# Patient Record
Sex: FEMALE | Race: WHITE | NOT HISPANIC OR LATINO | Employment: FULL TIME | URBAN - METROPOLITAN AREA
[De-identification: names, ages, dates, MRNs, and addresses within clinical notes are randomized per-mention and may not be internally consistent; named-entity substitution may affect disease eponyms.]

---

## 2017-01-27 ENCOUNTER — LAB VISIT (OUTPATIENT)
Dept: LAB | Facility: HOSPITAL | Age: 37
End: 2017-01-27
Attending: INTERNAL MEDICINE
Payer: COMMERCIAL

## 2017-01-27 ENCOUNTER — TELEPHONE (OUTPATIENT)
Dept: GASTROENTEROLOGY | Facility: CLINIC | Age: 37
End: 2017-01-27

## 2017-01-27 ENCOUNTER — OFFICE VISIT (OUTPATIENT)
Dept: GASTROENTEROLOGY | Facility: CLINIC | Age: 37
End: 2017-01-27
Payer: COMMERCIAL

## 2017-01-27 VITALS
HEIGHT: 56 IN | HEART RATE: 69 BPM | BODY MASS INDEX: 20.43 KG/M2 | DIASTOLIC BLOOD PRESSURE: 63 MMHG | SYSTOLIC BLOOD PRESSURE: 117 MMHG | WEIGHT: 90.81 LBS

## 2017-01-27 DIAGNOSIS — K85.90 PANCREATITIS, UNSPECIFIED PANCREATITIS TYPE: Primary | ICD-10-CM

## 2017-01-27 DIAGNOSIS — K85.90 PANCREATITIS, UNSPECIFIED PANCREATITIS TYPE: ICD-10-CM

## 2017-01-27 LAB
CHOLEST/HDLC SERPL: 2.9 {RATIO}
HDL/CHOLESTEROL RATIO: 35 %
HDLC SERPL-MCNC: 157 MG/DL
HDLC SERPL-MCNC: 55 MG/DL
LDLC SERPL CALC-MCNC: 84.2 MG/DL
NONHDLC SERPL-MCNC: 102 MG/DL
TRIGL SERPL-MCNC: 89 MG/DL

## 2017-01-27 PROCEDURE — 99999 PR PBB SHADOW E&M-NEW PATIENT-LVL III: CPT | Mod: PBBFAC,,, | Performed by: INTERNAL MEDICINE

## 2017-01-27 PROCEDURE — 1159F MED LIST DOCD IN RCRD: CPT | Mod: S$GLB,,, | Performed by: INTERNAL MEDICINE

## 2017-01-27 PROCEDURE — 36415 COLL VENOUS BLD VENIPUNCTURE: CPT

## 2017-01-27 PROCEDURE — 82787 IGG 1 2 3 OR 4 EACH: CPT

## 2017-01-27 PROCEDURE — 80061 LIPID PANEL: CPT

## 2017-01-27 PROCEDURE — 99204 OFFICE O/P NEW MOD 45 MIN: CPT | Mod: S$GLB,,, | Performed by: INTERNAL MEDICINE

## 2017-01-27 RX ORDER — BUTALBITAL, ACETAMINOPHEN AND CAFFEINE 50; 325; 40 MG/1; MG/1; MG/1
TABLET ORAL
Refills: 0 | COMMUNITY
Start: 2016-11-30 | End: 2017-09-27

## 2017-01-27 RX ORDER — ONDANSETRON HYDROCHLORIDE 8 MG/1
8 TABLET, FILM COATED ORAL EVERY 8 HOURS
Refills: 0 | COMMUNITY
Start: 2016-10-28 | End: 2017-09-27 | Stop reason: SDUPTHER

## 2017-01-27 NOTE — LETTER
January 27, 2017      Elder Alegria MD  [address] [facility] [place] [address]           [facility]  [address]  [address]  [facility] [address]  Phone: [phone]          Patient: Gogo Muro   MR Number: [id number]   YOB: 1980   Date of Visit: 1/27/2017       Dear Dr. Elder Alegria:    Thank you for referring Gogo Muro to me for evaluation. Attached you will find relevant portions of my assessment and plan of care.    If you have questions, please do not hesitate to call me. I look forward to following Gogo Muro along with you.    Sincerely,    Ketan Lara MD    Enclosure  CC:  No Recipients    If you would like to receive this communication electronically, please contact [email or web address] or [phone] to request more information on [email or web address] Link access.    For providers and/or their staff who would like to refer a patient to [facility], please contact us through our one-stop-shop provider referral line, [facility], at [phone].    If you feel you have received this communication in error or would no longer like to receive these types of communications, please e-mail [email or web address]

## 2017-01-27 NOTE — TELEPHONE ENCOUNTER
Patient scheduled for Upper EUS on Thursday 02/02/17 with Dr. Sewell.     Clear liquid and NPO prep instructions given and understood.   Explained that pt will receive a phone call with final prep instructions and arrival time.

## 2017-01-27 NOTE — PROGRESS NOTES
Subjective:       Patient ID: Gogo Muro is a 36 y.o. female.    Chief Complaint: Pancreatitis    This is a 28-year-old female who presents as a referral her prior gastroenterologist for evaluations of suspected recurrent idiopathic pancreatitis.  Symptoms began at an early age approximately 15 years ago when she was hospitalized in [place] for abdominal pain with suspected idiopathic pancreatitis.  There is no history of alcohol use and multiple ultrasounds over the years were reportedly normal.  In addition a CT with contrast and MRCP were normal for the results from her physicians.  She has a chronically elevated amylase and lipase less than 2 times upper limit of normal.  Symptoms she describes are epigastric pain which can radiate to the lower abdomen and some intermittent loose stools.  She had an upper endoscopy which was done reportedly showing only a small hiatal hernia and colonoscopy was unable to be completed.  In her imaging no intra-or extra hepatic biliary ductal dilatation was noted with a 3 mm common bile duct which is normal as was the spleen and liver.  There is no family history of hypertriglyceridemia.  I have reviewed her laboratory data as well noting LT of 10, AST 15, calcium 9.3 lipase 74.  She has been started on Creon with meals for the last 3 days and has noted improvement.  She does not pay attention to her loose stools.  No other exacerbating or relieving factors or other associated symptoms, the pain is severe and nonradiating.    The following portions of the patient's history were reviewed and updated as appropriate: allergies, current medications, past family history, past medical history, past social history, past surgical history and problem list.    (Portions of this note were dictated using voice recognition software and may contain dictation related errors in spelling/grammar/syntax not found on text review)    HPI  Review of Systems   Constitutional: Negative for appetite  change, chills and fever.   HENT: Negative for postnasal drip and trouble swallowing.    Eyes: Negative for pain and redness.   Respiratory: Negative for cough, choking, chest tightness and shortness of breath.    Cardiovascular: Negative for chest pain and leg swelling.   Gastrointestinal: Positive for abdominal pain. Negative for abdominal distention, anal bleeding, blood in stool, constipation, diarrhea, nausea, rectal pain and vomiting.   Endocrine: Negative for cold intolerance and heat intolerance.   Genitourinary: Negative for difficulty urinating and hematuria.   Musculoskeletal: Negative for arthralgias and back pain.   Skin: Negative for color change and pallor.   Allergic/Immunologic: Negative for environmental allergies and food allergies.   Neurological: Negative for dizziness and light-headedness.   Hematological: Negative for adenopathy. Does not bruise/bleed easily.   Psychiatric/Behavioral: Negative for agitation and behavioral problems.       Objective:      Physical Exam   Constitutional: She is oriented to person, place, and time. She appears well-developed and well-nourished. No distress.   HENT:   Head: Normocephalic and atraumatic.   Eyes: Conjunctivae are normal. No scleral icterus.   Neck: Normal range of motion. Neck supple. No tracheal deviation present. No thyromegaly present.   Cardiovascular: Normal rate and regular rhythm.  Exam reveals no gallop and no friction rub.    No murmur heard.  Pulmonary/Chest: Effort normal and breath sounds normal. No respiratory distress. She has no wheezes.   Abdominal: Soft. Bowel sounds are normal. She exhibits no distension. There is no tenderness.   Musculoskeletal:        Right wrist: She exhibits normal range of motion and no tenderness.        Left wrist: She exhibits normal range of motion and no tenderness.   Lymphadenopathy:        Head (right side): No submental and no submandibular adenopathy present.        Head (left side): No submental and  no submandibular adenopathy present.   Neurological: She is alert and oriented to person, place, and time.   Skin: Skin is warm and dry. No rash noted. She is not diaphoretic. No erythema.   Psychiatric: She has a normal mood and affect. Her behavior is normal.   Nursing note and vitals reviewed.      Outside records reviewed  Labs: reviewed, as above  Imaging: report of CT reviewed, as above  Assessment:       1. Pancreatitis, unspecified pancreatitis type        Plan:   1. Continue creon  2. EUS to evaluate gb, bile ducts, pancreas  3. Dietary modifications  4. Consider GES if EUS is normal  5. Labs for autoimmune pancreatitis

## 2017-01-30 LAB — IGG4 SER-MCNC: 70 MG/DL

## 2017-02-02 ENCOUNTER — ANESTHESIA (OUTPATIENT)
Dept: ENDOSCOPY | Facility: HOSPITAL | Age: 37
End: 2017-02-02
Payer: COMMERCIAL

## 2017-02-02 ENCOUNTER — ANESTHESIA EVENT (OUTPATIENT)
Dept: ENDOSCOPY | Facility: HOSPITAL | Age: 37
End: 2017-02-02
Payer: COMMERCIAL

## 2017-02-02 ENCOUNTER — SURGERY (OUTPATIENT)
Age: 37
End: 2017-02-02

## 2017-02-02 ENCOUNTER — HOSPITAL ENCOUNTER (OUTPATIENT)
Facility: HOSPITAL | Age: 37
Discharge: HOME | End: 2017-02-02
Attending: INTERNAL MEDICINE | Admitting: INTERNAL MEDICINE
Payer: COMMERCIAL

## 2017-02-02 VITALS
SYSTOLIC BLOOD PRESSURE: 114 MMHG | HEIGHT: 56 IN | OXYGEN SATURATION: 100 % | WEIGHT: 89 LBS | BODY MASS INDEX: 20.02 KG/M2 | TEMPERATURE: 98 F | HEART RATE: 92 BPM | DIASTOLIC BLOOD PRESSURE: 40 MMHG | RESPIRATION RATE: 18 BRPM

## 2017-02-02 DIAGNOSIS — K85.90 PANCREATITIS, UNSPECIFIED PANCREATITIS TYPE: ICD-10-CM

## 2017-02-02 PROCEDURE — 43239 EGD BIOPSY SINGLE/MULTIPLE: CPT | Performed by: INTERNAL MEDICINE

## 2017-02-02 PROCEDURE — 27201012 HC FORCEPS, HOT/COLD, DISP: Performed by: INTERNAL MEDICINE

## 2017-02-02 PROCEDURE — 25000003 PHARM REV CODE 250: Performed by: INTERNAL MEDICINE

## 2017-02-02 PROCEDURE — 63600175 PHARM REV CODE 636 W HCPCS: Performed by: NURSE ANESTHETIST, CERTIFIED REGISTERED

## 2017-02-02 PROCEDURE — 37000008 HC ANESTHESIA 1ST 15 MINUTES: Performed by: INTERNAL MEDICINE

## 2017-02-02 PROCEDURE — 88305 TISSUE EXAM BY PATHOLOGIST: CPT | Performed by: PATHOLOGY

## 2017-02-02 PROCEDURE — 37000009 HC ANESTHESIA EA ADD 15 MINS: Performed by: INTERNAL MEDICINE

## 2017-02-02 PROCEDURE — 25000003 PHARM REV CODE 250: Performed by: NURSE ANESTHETIST, CERTIFIED REGISTERED

## 2017-02-02 PROCEDURE — 43237 ENDOSCOPIC US EXAM ESOPH: CPT | Mod: ,,, | Performed by: INTERNAL MEDICINE

## 2017-02-02 PROCEDURE — 43259 EGD US EXAM DUODENUM/JEJUNUM: CPT | Performed by: INTERNAL MEDICINE

## 2017-02-02 PROCEDURE — 88305 TISSUE EXAM BY PATHOLOGIST: CPT | Mod: 26,,, | Performed by: PATHOLOGY

## 2017-02-02 RX ORDER — LIDOCAINE HCL/PF 100 MG/5ML
SYRINGE (ML) INTRAVENOUS
Status: DISCONTINUED | OUTPATIENT
Start: 2017-02-02 | End: 2017-02-02

## 2017-02-02 RX ORDER — MIDAZOLAM HYDROCHLORIDE 1 MG/ML
INJECTION, SOLUTION INTRAMUSCULAR; INTRAVENOUS
Status: DISCONTINUED | OUTPATIENT
Start: 2017-02-02 | End: 2017-02-02

## 2017-02-02 RX ORDER — SODIUM CHLORIDE 9 MG/ML
INJECTION, SOLUTION INTRAVENOUS CONTINUOUS
Status: DISCONTINUED | OUTPATIENT
Start: 2017-02-03 | End: 2017-02-02 | Stop reason: HOSPADM

## 2017-02-02 RX ORDER — PROPOFOL 10 MG/ML
VIAL (ML) INTRAVENOUS
Status: DISCONTINUED | OUTPATIENT
Start: 2017-02-02 | End: 2017-02-02

## 2017-02-02 RX ORDER — FENTANYL CITRATE 50 UG/ML
INJECTION, SOLUTION INTRAMUSCULAR; INTRAVENOUS
Status: DISCONTINUED | OUTPATIENT
Start: 2017-02-02 | End: 2017-02-02

## 2017-02-02 RX ORDER — PROPOFOL 10 MG/ML
VIAL (ML) INTRAVENOUS CONTINUOUS PRN
Status: DISCONTINUED | OUTPATIENT
Start: 2017-02-02 | End: 2017-02-02

## 2017-02-02 RX ORDER — ONDANSETRON 2 MG/ML
INJECTION INTRAMUSCULAR; INTRAVENOUS
Status: DISCONTINUED | OUTPATIENT
Start: 2017-02-02 | End: 2017-02-02

## 2017-02-02 RX ADMIN — SODIUM CHLORIDE: 0.9 INJECTION, SOLUTION INTRAVENOUS at 01:02

## 2017-02-02 RX ADMIN — FENTANYL CITRATE 50 MCG: 50 INJECTION, SOLUTION INTRAMUSCULAR; INTRAVENOUS at 01:02

## 2017-02-02 RX ADMIN — PROPOFOL 150 MG: 10 INJECTION, EMULSION INTRAVENOUS at 01:02

## 2017-02-02 RX ADMIN — ONDANSETRON 4 MG: 2 INJECTION, SOLUTION INTRAMUSCULAR; INTRAVENOUS at 01:02

## 2017-02-02 RX ADMIN — MIDAZOLAM 1 MG: 1 INJECTION INTRAMUSCULAR; INTRAVENOUS at 01:02

## 2017-02-02 RX ADMIN — SODIUM CHLORIDE: 0.9 INJECTION, SOLUTION INTRAVENOUS at 12:02

## 2017-02-02 RX ADMIN — LIDOCAINE HYDROCHLORIDE 50 MG: 20 INJECTION, SOLUTION INTRAVENOUS at 01:02

## 2017-02-02 RX ADMIN — PROPOFOL 150 MCG/KG/MIN: 10 INJECTION, EMULSION INTRAVENOUS at 01:02

## 2017-02-02 NOTE — TRANSFER OF CARE
"Anesthesia Transfer of Care Note    Patient: Gogo Muro    Procedure(s) Performed: Procedure(s) (LRB):  ULTRASOUND-ENDOSCOPIC-UPPER (N/A)    Patient location: GI    Anesthesia Type: MAC    Transport from OR: Transported from OR on room air with adequate spontaneous ventilation    Post pain: adequate analgesia    Post assessment: no apparent anesthetic complications and tolerated procedure well    Post vital signs: stable    Level of consciousness: awake, oriented and alert    Nausea/Vomiting: no nausea/vomiting    Complications: none          Last vitals:   Visit Vitals    /68    Pulse 86    Temp 36.7 °C (98 °F)    Resp 17    Ht 4' 8" (1.422 m)    Wt 40.4 kg (89 lb)    SpO2 99%    Breastfeeding No    BMI 19.95 kg/m2     "

## 2017-02-02 NOTE — IP AVS SNAPSHOT
[facility]  [address]  [place] [address]  Phone: [phone]           Patient Discharge Instructions     Our goal is to set you up for success. This packet includes information on your condition, medications, and your home care. It will help you to care for yourself so you don't get sicker and need to go back to the hospital.     Please ask your nurse if you have any questions.        There are many details to remember when preparing to leave the hospital. Here is what you will need to do:    1. Take your medicine. If you are prescribed medications, review your Medication List in the following pages. You may have new medications to  at the pharmacy and others that you'll need to stop taking. Review the instructions for how and when to take your medications. Talk with your doctor or nurses if you are unsure of what to do.     2. Go to your follow-up appointments. Specific follow-up information is listed in the following pages. Your may be contacted by a transition nurse or clinical provider about future appointments. Be sure we have all of the phone numbers to reach you, if needed. Please contact your provider's office if you are unable to make an appointment.     3. Watch for warning signs. Your doctor or nurse will give you detailed warning signs to watch for and when to call for assistance. These instructions may also include educational information about your condition. If you experience any of warning signs to your health, call your doctor.               ** Verify the list of medication(s) below is accurate and up to date. Carry this with you in case of emergency. If your medications have changed, please notify your healthcare provider.             Medication List      CONTINUE taking these medications        Additional Info                      butalbital-acetaminophen-caffeine -40 mg -40 mg per tablet   Commonly known as:  FIORICET, ESGIC   Refills:  0     Instructions:  take 1-2 tablets by mouth every 6 hours if needed for headache     Begin Date    AM    Noon    PM    Bedtime       CREON 24,000-76,000 -120,000 unit capsule   Refills:  0   Dose:  1 capsule   Generic drug:  lipase-protease-amylase 24,000-76,000-120,000 units    Instructions:  Take 1 capsule by mouth 3 (three) times daily with meals.     Begin Date    AM    Noon    PM    Bedtime       ondansetron 8 MG tablet   Commonly known as:  ZOFRAN   Refills:  0   Dose:  8 mg    Instructions:  Take 8 mg by mouth every 8 (eight) hours.     Begin Date    AM    Noon    PM    Bedtime                  Please bring to all follow up appointments:    1. A copy of your discharge instructions.  2. All medicines you are currently taking in their original bottles.  3. Identification and insurance card.    Please arrive 15 minutes ahead of scheduled appointment time.    Please call 24 hours in advance if you must reschedule your appointment and/or time.        Follow-up Information     Follow up with Elder Alegria MD.    Specialty:  Gastroenterology    Why:  As needed    Contact information:    [address] [facility] [address]  [phone]          Follow up with Ketan Lara MD In 6 weeks.    Specialty:  Gastroenterology    Contact information:    [address]  [address]  [place] [id number] [phone]          Follow up with Gene Sewell MD.    Specialty:  Gastroenterology    Why:  As needed, Office will call with biopsy / pathology report    Contact information:    [address]  [address]  [facility] [address] [phone]          Discharge Instructions     Future Orders    Diet general     Questions:    Total calories:      Fat restriction, if any:      Protein restriction, if any:      Na restriction, if any:      Fluid restriction:      Additional restrictions:      Endoscopic ultrasound (upper)     Questions:    Pre-op visit required?:      Special needs:          Discharge  "Instructions       Post Upper EUS Instructions:     Gogo Muro  2/2/2017  Gene Sosa*    1. Do Not eat or drink anything for 1 hour. Try sips of water first. If tolerated, resume your regular diet or one recommended by your physician.  2. Do not drive, or operate machinery, make critical decisions, or do activities that require coordination or balance for 24 hours.  3. You may experience a sore throat for 24 to 48 hours. You may use throat lozenges or gargle with warm salt water to relieve the discomfort.  4. Because air was put into your stomach during the procedure, you may experience some belching.   Go directly to the emergency room if you notice any of the following:                              Chills and/or fever over 101                Persistent vomiting or vomiting with blood                Severe abdominal pain, other than gas cramps                Severe chest pain                Black, tarry stools    If you have any questions or problems, please call your Physician:    Gene Sosa* Phone: ***    Lab Results: [phone]    If a complication or emergency situation arises and you are unable to reach your Physician - GO TO THE EMERGENCY ROOM.        Primary Diagnosis     Your primary diagnosis was:  Recurrent Pancreatitis      Admission Information     Date & Time Provider Department [facility]    2/2/2017  9:53 AM Gene Sewell MD [facility] [id number]      Care Providers     Provider Role Specialty Primary office phone    Gene Sewell MD Attending Provider Gastroenterology [phone]    Gene Sewell MD Surgeon  Gastroenterology [phone]      Your Vitals Were     BP Pulse Temp Resp Height Weight    107/75 (BP Location: Left arm, Patient Position: Lying, BP Method: Automatic) 90 97.7 °F (36.5 °C) (Oral) 16 4' 8" (1.422 m) 40.4 kg (89 lb)    SpO2 BMI             99% 19.95 kg/m2         Recent Lab Values     No lab " values to display.      Pending Labs     Order Current Status    Specimen to Pathology - Surgery Collected (02/02/17 1400)      Allergies as of 2/2/2017        Reactions    Ibuprofen Palpitations    Keflex [Cephalexin] Rash    Sulfa (Sulfonamide Antibiotics) Rash      [facility]     [facility] Nurse Care Line - 24/7 Assistance  Unless otherwise directed by your provider, please contact [facility], our nurse care line that is available for 24/7 assistance.     Registered nurses in the [facility] provide clinical advisement, health education, appointment booking, and other advisory services.  Call for this free service at [phone].        Advance Directives     An advance directive is a document which, in the event you are no longer able to make decisions for yourself, tells your healthcare team what kind of treatment you do or do not want to receive, or who you would like to make those decisions for you.  If you do not currently have an advance directive, [facility] encourages you to create one.  For more information call:  [phone]), [phone] ([phone]),  or log on to [email or web address].        Language Assistance Services     ATTENTION: Language assistance services are available, free of charge. Please call [phone].      ATENCIÓN: Si habla español, tiene a jacobson disposición servicios gratuitos de asistencia lingüística. Llame al [phone].     CHÚ Ý: N?u b?n nói Ti?ng Vi?t, có các d?ch v? h? tr? ngôn ng? mi?n phí dành cho b?n. G?i s? [phone].         [facility] complies with applicable Federal civil rights laws and does not discriminate on the basis of race, color, national origin, age, disability, or sex.

## 2017-02-02 NOTE — INTERVAL H&P NOTE
The patient has been examined and the H&P has been reviewed:    I concur with the findings and changes have been noted since the H&P was written: symptoms have greatly improved with initiation of Creon therapy    Anesthesia/Surgery risks, benefits and alternative options discussed and understood by patient/family.          Active Hospital Problems    Diagnosis  POA    Pancreatitis, recurrent [K86.1]  Yes      Resolved Hospital Problems    Diagnosis Date Resolved POA   No resolved problems to display.

## 2017-02-02 NOTE — DISCHARGE INSTRUCTIONS
Post Upper EUS Instructions:     Gogocarrie Burriss  2/2/2017  Gene Sosa*    1. Do Not eat or drink anything for 1 hour. Try sips of water first. If tolerated, resume your regular diet or one recommended by your physician.  2. Do not drive, or operate machinery, make critical decisions, or do activities that require coordination or balance for 24 hours.  3. You may experience a sore throat for 24 to 48 hours. You may use throat lozenges or gargle with warm salt water to relieve the discomfort.  4. Because air was put into your stomach during the procedure, you may experience some belching.   Go directly to the emergency room if you notice any of the following:                              Chills and/or fever over 101                Persistent vomiting or vomiting with blood                Severe abdominal pain, other than gas cramps                Severe chest pain                Black, tarry stools    If you have any questions or problems, please call your Physician:    Gene Sosa* Phone: ***    Lab Results: [phone]    If a complication or emergency situation arises and you are unable to reach your Physician - GO TO THE EMERGENCY ROOM.

## 2017-02-02 NOTE — PLAN OF CARE
Patient progressing appropriately. Discharge criteria met. Dr. Sewell at bedside. No distress noted.

## 2017-02-02 NOTE — ANESTHESIA POSTPROCEDURE EVALUATION
"Anesthesia Post Evaluation    Patient: Gogo Muro    Procedure(s) Performed: Procedure(s) (LRB):  ULTRASOUND-ENDOSCOPIC-UPPER (N/A)    Final Anesthesia Type: MAC  Patient location during evaluation: GI PACU  Patient participation: Yes- Able to Participate  Level of consciousness: awake and alert and oriented  Post-procedure vital signs: reviewed and stable  Pain management: adequate  Airway patency: patent  PONV status at discharge: No PONV  Anesthetic complications: no      Cardiovascular status: blood pressure returned to baseline and hemodynamically stable  Respiratory status: unassisted, room air and spontaneous ventilation  Hydration status: euvolemic  Follow-up not needed.        Visit Vitals    /68    Pulse 86    Temp 36.7 °C (98 °F)    Resp 17    Ht 4' 8" (1.422 m)    Wt 40.4 kg (89 lb)    SpO2 99%    Breastfeeding No    BMI 19.95 kg/m2       Pain/Isabelle Score: Pain Assessment Performed: Yes (2/2/2017 11:26 AM)  Presence of Pain: assumed presence of pain (specify) (2/2/2017 11:26 AM)  Pain Rating Prior to Med Admin: 0 (2/2/2017 11:26 AM)  Pain Rating Post Med Admin: 0 (2/2/2017 11:26 AM)      "

## 2017-02-02 NOTE — ANESTHESIA PREPROCEDURE EVALUATION
2017  Gogo Muro is a 36 y.o., female w pancreattis for EUS.    There is no problem list on file for this patient.    Past Medical History   Diagnosis Date    Pancreatitis     PONV (postoperative nausea and vomiting)      Past Surgical History   Procedure Laterality Date     section       x 3    Hysterectomy       ALLERGIES 2017   Allergen Reactions    Ibuprofen Palpitations    Keflex [cephalexin] Rash    Sulfa (sulfonamide antibiotics) Rash     ANES-RELATED HOME Rx  Fiorecet    [facility] Anesthesia Evaluation         Review of Systems  Anesthesia Hx:  Hx of Anesthetic complications History of prior surgery of interest to airway management or planning: Personal Hx of Anesthesia complications, Post-Operative Nausea/Vomiting   Social:  Non-Smoker, No Alcohol Use    Hematology/Oncology:  Hematology Normal        EENT/Dental:EENT/Dental Normal   Cardiovascular:  Cardiovascular Normal     Pulmonary:  Pulmonary Normal    Renal/:  Renal/ Normal     Hepatic/GI:   Pancreatitis   OB/GYN/PEDS:  hysterectomy   Neurological:  Neurology Normal    Endocrine:  Endocrine Normal        Physical Exam   Airway/Jaw/Neck:  AIRWAY FINDINGS: Normal      Eyes/Ears/Nose:  EYES/EARS/NOSE FINDINGS: Normal    Chest/Lungs:  Chest/Lungs Clear    Heart/Vascular:  Heart Findings: Normal Heart murmur: negative       Mental Status:  Mental Status Findings: Normal        Anesthesia Plan  Type of Anesthesia, risks & benefits discussed:  Anesthesia Type:  MAC  Patient's Preference:   Intra-op Monitoring Plan:   Intra-op Monitoring Plan Comments:   Post Op Pain Control Plan:   Post Op Pain Control Plan Comments:   Induction:   IV  Beta Blocker:  Patient is not currently on a Beta-Blocker (No further documentation required).       Informed Consent: Patient understands risks and agrees with Anesthesia plan.  Questions  answered. Anesthesia consent signed with patient.  ASA Score: 2     Day of Surgery Review of History & Physical:        Anesthesia Plan Notes: 2017-02-02   - moderately high anxiety   - NPO 2 days   - PONV        Ready For Surgery From Anesthesia Perspective.

## 2017-02-06 ENCOUNTER — OFFICE VISIT (OUTPATIENT)
Dept: GASTROENTEROLOGY | Facility: CLINIC | Age: 37
End: 2017-02-06
Payer: COMMERCIAL

## 2017-02-06 ENCOUNTER — LAB VISIT (OUTPATIENT)
Dept: LAB | Facility: HOSPITAL | Age: 37
End: 2017-02-06
Attending: INTERNAL MEDICINE
Payer: COMMERCIAL

## 2017-02-06 VITALS
BODY MASS INDEX: 20.09 KG/M2 | HEART RATE: 73 BPM | SYSTOLIC BLOOD PRESSURE: 132 MMHG | DIASTOLIC BLOOD PRESSURE: 59 MMHG | HEIGHT: 56 IN | WEIGHT: 89.31 LBS

## 2017-02-06 DIAGNOSIS — R10.13 EPIGASTRIC ABDOMINAL PAIN: ICD-10-CM

## 2017-02-06 DIAGNOSIS — R10.13 EPIGASTRIC ABDOMINAL PAIN: Primary | ICD-10-CM

## 2017-02-06 LAB
ALBUMIN SERPL BCP-MCNC: 4.4 G/DL
ALP SERPL-CCNC: 51 U/L
ALT SERPL W/O P-5'-P-CCNC: 15 U/L
AMYLASE SERPL-CCNC: 219 U/L
ANION GAP SERPL CALC-SCNC: 9 MMOL/L
AST SERPL-CCNC: 14 U/L
BASOPHILS # BLD AUTO: 0.02 K/UL
BASOPHILS NFR BLD: 0.3 %
BILIRUB SERPL-MCNC: 1 MG/DL
BUN SERPL-MCNC: 5 MG/DL
CALCIUM SERPL-MCNC: 9.4 MG/DL
CHLORIDE SERPL-SCNC: 109 MMOL/L
CO2 SERPL-SCNC: 23 MMOL/L
CREAT SERPL-MCNC: 0.7 MG/DL
CRP SERPL-MCNC: 0.4 MG/L
DIFFERENTIAL METHOD: NORMAL
EOSINOPHIL # BLD AUTO: 0 K/UL
EOSINOPHIL NFR BLD: 0.3 %
ERYTHROCYTE [DISTWIDTH] IN BLOOD BY AUTOMATED COUNT: 12 %
EST. GFR  (AFRICAN AMERICAN): >60 ML/MIN/1.73 M^2
EST. GFR  (NON AFRICAN AMERICAN): >60 ML/MIN/1.73 M^2
GLUCOSE SERPL-MCNC: 85 MG/DL
HCT VFR BLD AUTO: 39.9 %
HGB BLD-MCNC: 13.5 G/DL
LIPASE SERPL-CCNC: 167 U/L
LYMPHOCYTES # BLD AUTO: 1.9 K/UL
LYMPHOCYTES NFR BLD: 27.5 %
MCH RBC QN AUTO: 31 PG
MCHC RBC AUTO-ENTMCNC: 33.8 %
MCV RBC AUTO: 92 FL
MONOCYTES # BLD AUTO: 0.4 K/UL
MONOCYTES NFR BLD: 6.1 %
NEUTROPHILS # BLD AUTO: 4.4 K/UL
NEUTROPHILS NFR BLD: 65.8 %
PLATELET # BLD AUTO: 213 K/UL
PMV BLD AUTO: 10.7 FL
POTASSIUM SERPL-SCNC: 4 MMOL/L
PROT SERPL-MCNC: 7.6 G/DL
RBC # BLD AUTO: 4.35 M/UL
SODIUM SERPL-SCNC: 141 MMOL/L
WBC # BLD AUTO: 6.73 K/UL

## 2017-02-06 PROCEDURE — 80053 COMPREHEN METABOLIC PANEL: CPT

## 2017-02-06 PROCEDURE — 99214 OFFICE O/P EST MOD 30 MIN: CPT | Mod: S$GLB,,, | Performed by: INTERNAL MEDICINE

## 2017-02-06 PROCEDURE — 83690 ASSAY OF LIPASE: CPT

## 2017-02-06 PROCEDURE — 81404 MOPATH PROCEDURE LEVEL 5: CPT

## 2017-02-06 PROCEDURE — 36415 COLL VENOUS BLD VENIPUNCTURE: CPT

## 2017-02-06 PROCEDURE — 86140 C-REACTIVE PROTEIN: CPT

## 2017-02-06 PROCEDURE — 99999 PR PBB SHADOW E&M-EST. PATIENT-LVL III: CPT | Mod: PBBFAC,,, | Performed by: INTERNAL MEDICINE

## 2017-02-06 PROCEDURE — 85025 COMPLETE CBC W/AUTO DIFF WBC: CPT

## 2017-02-06 PROCEDURE — 82150 ASSAY OF AMYLASE: CPT

## 2017-02-07 NOTE — PROGRESS NOTES
Subjective:       Patient ID: Gogo Muro is a 36 y.o. female.    Chief Complaint: Abdominal Pain    This is a 28-year-old female who presents for a f/u visit regarding suspected recurrent idiopathic pancreatitis. Symptoms began at an early age approximately 15 years ago when she was hospitalized in [place] for abdominal pain with suspected idiopathic pancreatitis. There is no history of alcohol use and multiple ultrasounds over the years were reportedly normal. In addition a CT with contrast and MRCP were normal per the results from her physicians. She has a chronically elevated amylase and lipase less than 2 times upper limit of normal. The symptoms she describes are epigastric pain which can radiate to the lower abdomen and some intermittent loose stools. She had an upper endoscopy which was done reportedly showing only a small hiatal hernia and colonoscopy was unable to be completed. In her imaging no intra-or extra hepatic biliary ductal dilatation was noted with a 3 mm common bile duct which is normal as was the spleen and liver. There is no family history of hypertriglyceridemia. I have reviewed her laboratory data as well noting  An ALT of 10, AST 15, calcium 9.3 lipase 74. Prior to her last visit she has been started on Creon with meals for the last 3 days and has noted improvement. No other exacerbating or relieving factors or other associated symptoms, the pain is severe and nonradiating.     She underwent an EUS noting some daya-pancreatic stranding, but no findings consistent with chronic pancreatitis, malignancy or pancreatic/biliary ductal abnormalities. Post-procedurally she did ok, but two days ago noted recurrence of her symptoms - now more severe. It is severe, epigastric discomfort, worsened with food intake (all types). She is able to tolerate liquids. It is non-radiating with associated nausea. No BMs over the past 4 days. No fevers, dark urine or chills. Overall she just feels terrible. TG  and IgG4 were normal.     The following portions of the patient's history were reviewed and updated as appropriate: allergies, current medications, past family history, past medical history, past social history, past surgical history and problem list.        HPI  Review of Systems   Constitutional: Negative for appetite change, chills and fever.   HENT: Positive for sore throat. Negative for postnasal drip and trouble swallowing.    Eyes: Negative for pain and redness.   Respiratory: Negative for cough, choking, chest tightness and shortness of breath.    Cardiovascular: Negative for chest pain and leg swelling.   Gastrointestinal: Positive for abdominal pain and nausea. Negative for abdominal distention, anal bleeding, blood in stool, constipation, diarrhea, rectal pain and vomiting.   Endocrine: Negative for cold intolerance and heat intolerance.   Genitourinary: Negative for difficulty urinating and hematuria.   Musculoskeletal: Negative for arthralgias and back pain.   Skin: Negative for color change and pallor.   Allergic/Immunologic: Negative for environmental allergies and food allergies.   Neurological: Negative for dizziness and light-headedness.   Hematological: Negative for adenopathy. Does not bruise/bleed easily.   Psychiatric/Behavioral: Negative for agitation and behavioral problems.       Objective:      Physical Exam   Constitutional: She is oriented to person, place, and time. She appears well-developed and well-nourished.   Uncomfortable appearing   HENT:   Head: Normocephalic and atraumatic.   Eyes: Conjunctivae are normal. No scleral icterus.   Neck: Normal range of motion. Neck supple. No tracheal deviation present. No thyromegaly present.   Cardiovascular: Normal rate and regular rhythm.  Exam reveals no gallop and no friction rub.    No murmur heard.  Pulmonary/Chest: Effort normal and breath sounds normal. No respiratory distress. She has no wheezes.   Abdominal: Soft. Bowel sounds are  normal. She exhibits no distension. There is tenderness.   ttp in mid-epigastrium   Musculoskeletal:        Right wrist: She exhibits normal range of motion and no tenderness.        Left wrist: She exhibits normal range of motion and no tenderness.   Lymphadenopathy:        Head (right side): No submental and no submandibular adenopathy present.        Head (left side): No submental and no submandibular adenopathy present.   Neurological: She is alert and oriented to person, place, and time.   Skin: Skin is warm and dry. No rash noted. She is not diaphoretic. No erythema.   Psychiatric: She has a normal mood and affect. Her behavior is normal.   Nursing note and vitals reviewed.      Pathology: pending  Labs: reviewed  Assessment:       1. Epigastric abdominal pain        Plan:   1. Autoimmune markers noted normal; given age of onset, will send hereditary panel gene assay  2. Pain control  3. Discussed dietary modifications  4. Send labs  5. Will try to facilitate a secretin stimulated MRCP to eval for pancreatic SOD

## 2017-02-22 LAB
Lab: NEGATIVE
Lab: NORMAL

## 2017-02-23 ENCOUNTER — TELEPHONE (OUTPATIENT)
Dept: GASTROENTEROLOGY | Facility: CLINIC | Age: 37
End: 2017-02-23

## 2017-02-23 NOTE — TELEPHONE ENCOUNTER
Spoke with patient to inform her that Per  appointment can be cancelled. Patient was informed that if her symptoms worse she can return call to office to schedule appointment. But as of now we will contact back once hearing from radiology dept regarding tests. Patient verbalized understanding.  ----- Message from Stephany Ordaz MA sent at 2017  3:38 PM CST -----      ----- Message -----     From: Ketan Lara MD     Sent: 2017  11:03 AM       To: Stephany Ordaz MA    We can hold off on the apt at this time, I'm trying to design a protocol for our radiology dept to have the testing needed. We can reiterate that we've been exhaustive in our search. I would not suspect that air travel would affect her symptoms. I'm glad she's doing somewhat better.    ----- Message -----     From: Stephany Ordaz MA     Sent: 2017   2:09 PM       To: Ketan Lara MD    I spoke with Gogo today. She reports that she has been having better days although, This past weekend she experienced more stress than normal as there was a death in her family. She experienced increased abd pain and wasn't sure if it was related to the stress. The  is in [place] and she was concerned if air travel would hurt her pancreas or cause increased pain. She continues to modify her diet. She does have a f/u appointment on 17. The appointment was originally scheduled to follow-up on some testing that didn't happen. Does she still need to come in to see you?  ----- Message -----     From: Ketan Lara MD     Sent: 2017   8:10 PM       To: Stephany Ordaz MA    I've contacted other GI colleagues at main [place] [facility] as well and manometry is not available, I then discussed with radiology and Dr. Noyola about setting a protocol here to begin to do these stimulated studies. Can we check on her symptoms  ----- Message -----     From: Stephany Ordaz MA     Sent: 2017   3:04 PM       To: Ketan Lara  MD    Please....I Need your help on this. I don't know what your next step is with this patient.  ----- Message -----     From: Ketan Lara MD     Sent: 2/6/2017   8:21 PM       To: Stephany Ordaz MA    Mildly elevated amylase/lipase, 2-3x upper limit of normal. She should continue dietary modifications as we discussed in clinic. Pain medications as needed for discomfort. We sent genetics panel and need to find where she can have the secretin stimulated MRCP

## 2017-03-05 ENCOUNTER — PATIENT MESSAGE (OUTPATIENT)
Dept: GASTROENTEROLOGY | Facility: CLINIC | Age: 37
End: 2017-03-05

## 2017-03-08 ENCOUNTER — TELEPHONE (OUTPATIENT)
Dept: GASTROENTEROLOGY | Facility: CLINIC | Age: 37
End: 2017-03-08

## 2017-07-05 ENCOUNTER — OFFICE VISIT (OUTPATIENT)
Dept: GASTROENTEROLOGY | Facility: CLINIC | Age: 37
End: 2017-07-05
Payer: COMMERCIAL

## 2017-07-05 ENCOUNTER — TELEPHONE (OUTPATIENT)
Dept: GASTROENTEROLOGY | Facility: CLINIC | Age: 37
End: 2017-07-05

## 2017-07-05 ENCOUNTER — PATIENT MESSAGE (OUTPATIENT)
Dept: GASTROENTEROLOGY | Facility: CLINIC | Age: 37
End: 2017-07-05

## 2017-07-05 VITALS
DIASTOLIC BLOOD PRESSURE: 66 MMHG | HEIGHT: 56 IN | WEIGHT: 88.88 LBS | HEART RATE: 76 BPM | BODY MASS INDEX: 20 KG/M2 | SYSTOLIC BLOOD PRESSURE: 110 MMHG

## 2017-07-05 DIAGNOSIS — R10.9 ABDOMINAL PAIN, UNSPECIFIED LOCATION: ICD-10-CM

## 2017-07-05 PROCEDURE — 99214 OFFICE O/P EST MOD 30 MIN: CPT | Mod: S$GLB,,, | Performed by: INTERNAL MEDICINE

## 2017-07-05 PROCEDURE — 99999 PR PBB SHADOW E&M-EST. PATIENT-LVL III: CPT | Mod: PBBFAC,,, | Performed by: INTERNAL MEDICINE

## 2017-07-05 NOTE — PROGRESS NOTES
Subjective:       Patient ID: Gogo Muro is a 37 y.o. female.    Chief Complaint: Abdominal Pain    This is a 37-year-old female who presents for a f/u visit regarding suspected recurrent, idiopathic pancreatitis. Her symptoms began at an early age approximately 15 years ago when she was hospitalized in [place] for abdominal pain with suspected idiopathic pancreatitis. There is no history of alcohol use (prior or current) and multiple ultrasounds over the years were reportedly normal without evidence of biliary ductal dilation, cholelithiasis or gallbladder sludge. In addition a CT with contrast and MRCP were normal per the results from her physicians. She has a chronically elevated amylase and lipase less than 2 times upper limit of normal, though this fluctuates. The symptoms she describes are epigastric pain which can radiate to the lower abdomen and some intermittent loose stools. She had an upper endoscopy which was done reportedly showing only a small hiatal hernia and colonoscopy which was unable to be completed. In her imaging no intra-or extra hepatic biliary ductal dilatation was noted with a 3 mm common bile duct which is normal as was the spleen and liver. There is no personal or family history of hypertriglyceridemia. With each episode her LFTs remain normal.  Creon may have intermittently provided mild relief, but no reliably so as of lately. No other exacerbating or relieving factors or other associated symptoms, the pain is severe and nonradiating.      She underwent an EUS noting some daya-pancreatic stranding, but no findings consistent with chronic pancreatitis, malignancy or pancreatic/biliary ductal abnormalities.No fevers, dark urine or chills.  TG and IgG4 were normal. Genetic testing was unremarkable as well. She was seen in the ER yesterday in [place] for recurrent symptoms, same in character. No vomiting. Nausea is controlled with Zofran. She is able to tolerate a bland diet. She meets  criteria with her lipase of >3x ULN and pain consistent with pancreatitis.      The following portions of the patient's history were reviewed and updated as appropriate: allergies, current medications, past family history, past medical history, past social history, past surgical history and problem list.    HPI  Review of Systems   Respiratory: Negative for apnea and chest tightness.    Cardiovascular: Negative for chest pain and leg swelling.   Gastrointestinal: Positive for abdominal pain and nausea.       Objective:      Physical Exam   Constitutional: She is oriented to person, place, and time. She appears well-developed and well-nourished.   HENT:   Head: Normocephalic and atraumatic.   Eyes: Conjunctivae are normal. No scleral icterus.   Neck: Normal range of motion. Neck supple. No tracheal deviation present. No thyromegaly present.   Pulmonary/Chest: Effort normal. No respiratory distress.   Abdominal:   ttp in mid-epigastrium   Neurological: She is alert and oriented to person, place, and time.   Skin: Skin is warm and dry. No rash noted. She is not diaphoretic. No erythema.   Psychiatric: She has a normal mood and affect. Her behavior is normal.   Nursing note and vitals reviewed.      Labs; outside labs reviewed  Assessment:       1. Pancreatitis, recurrent    2. Abdominal pain, unspecified location        Plan:   1. Unclear etiology, considering pancreatic sod  - closest facility located for manometry was [facility]; pt has family in [place], will see if we can find a facility there  2. Dietary/meds for control of symptoms

## 2017-07-05 NOTE — TELEPHONE ENCOUNTER
----- Message from Clare Stephens sent at 7/5/2017  7:39 AM CDT -----  No. [phone]    Patient was in the ER last night in [place] because of pancreas pain.  She is landing in [place] today at 9:55.  She would like to see Dr. Lara around 11:00.  Please call.    She will be in flight this morning.  Please leave a message.

## 2017-07-17 ENCOUNTER — TELEPHONE (OUTPATIENT)
Dept: GASTROENTEROLOGY | Facility: CLINIC | Age: 37
End: 2017-07-17

## 2017-07-17 NOTE — TELEPHONE ENCOUNTER
Left message regarding research for procedure with [facility]. Procedure is not done at [facility] in [place].

## 2017-07-19 ENCOUNTER — PATIENT MESSAGE (OUTPATIENT)
Dept: GASTROENTEROLOGY | Facility: CLINIC | Age: 37
End: 2017-07-19

## 2017-09-27 ENCOUNTER — OFFICE VISIT (OUTPATIENT)
Dept: GASTROENTEROLOGY | Facility: CLINIC | Age: 37
End: 2017-09-27
Payer: COMMERCIAL

## 2017-09-27 ENCOUNTER — LAB VISIT (OUTPATIENT)
Dept: LAB | Facility: HOSPITAL | Age: 37
End: 2017-09-27
Attending: INTERNAL MEDICINE
Payer: COMMERCIAL

## 2017-09-27 VITALS
SYSTOLIC BLOOD PRESSURE: 126 MMHG | DIASTOLIC BLOOD PRESSURE: 56 MMHG | HEART RATE: 68 BPM | BODY MASS INDEX: 19.92 KG/M2 | WEIGHT: 88.88 LBS

## 2017-09-27 DIAGNOSIS — R10.9 ABDOMINAL PAIN, UNSPECIFIED LOCATION: ICD-10-CM

## 2017-09-27 DIAGNOSIS — R10.9 ABDOMINAL PAIN, UNSPECIFIED LOCATION: Primary | ICD-10-CM

## 2017-09-27 LAB
AMYLASE SERPL-CCNC: 160 U/L
CRP SERPL-MCNC: 0.3 MG/L
IGA SERPL-MCNC: 203 MG/DL
IGA SERPL-MCNC: 203 MG/DL
IGG SERPL-MCNC: 1288 MG/DL
IGM SERPL-MCNC: 190 MG/DL
LIPASE SERPL-CCNC: 68 U/L

## 2017-09-27 PROCEDURE — 83690 ASSAY OF LIPASE: CPT

## 2017-09-27 PROCEDURE — 82784 ASSAY IGA/IGD/IGG/IGM EACH: CPT | Mod: 59

## 2017-09-27 PROCEDURE — 36415 COLL VENOUS BLD VENIPUNCTURE: CPT

## 2017-09-27 PROCEDURE — 83516 IMMUNOASSAY NONANTIBODY: CPT

## 2017-09-27 PROCEDURE — 99999 PR PBB SHADOW E&M-EST. PATIENT-LVL II: CPT | Mod: PBBFAC,,, | Performed by: INTERNAL MEDICINE

## 2017-09-27 PROCEDURE — 3008F BODY MASS INDEX DOCD: CPT | Mod: S$GLB,,, | Performed by: INTERNAL MEDICINE

## 2017-09-27 PROCEDURE — 99214 OFFICE O/P EST MOD 30 MIN: CPT | Mod: S$GLB,,, | Performed by: INTERNAL MEDICINE

## 2017-09-27 PROCEDURE — 86140 C-REACTIVE PROTEIN: CPT

## 2017-09-27 PROCEDURE — 82150 ASSAY OF AMYLASE: CPT

## 2017-09-27 RX ORDER — ONDANSETRON HYDROCHLORIDE 8 MG/1
8 TABLET, FILM COATED ORAL EVERY 6 HOURS PRN
Qty: 90 TABLET | Refills: 0 | Status: SHIPPED | OUTPATIENT
Start: 2017-09-27 | End: 2019-12-06 | Stop reason: SDUPTHER

## 2017-09-27 RX ORDER — ALPRAZOLAM 0.25 MG/1
TABLET ORAL
COMMUNITY
Start: 2017-08-23 | End: 2022-01-26

## 2017-09-27 NOTE — PATIENT INSTRUCTIONS
Information faxed to [facility] [phone].  Confirmed faxed received.  Will call patient to schedule Gastric Emptying Study.

## 2017-09-27 NOTE — PROGRESS NOTES
Subjective:       Patient ID: Gogo Muro is a 37 y.o. female.    Chief Complaint: Follow-up    This is a 37-year-old female who presents for a f/u visit regarding suspected recurrent, idiopathic pancreatitis. Her symptoms began approximately 15 years ago when she was hospitalized in [place] for abdominal pain with suspected idiopathic pancreatitis. There is no history of alcohol use (prior or current) and multiple ultrasounds over the years were reportedly normal without evidence of biliary ductal dilation, cholelithiasis or gallbladder sludge. In addition a CT with contrast and MRCP were normal. She has a chronically elevated amylase and lipase less than 3 times upper limit of normal. The symptoms she describes are epigastric pain which can radiate to the lower abdomen and some intermittent loose stools. The pain can be mild-severe, last for hours-days with some improvement by going to a liquid diet. She had an upper endoscopy which was done reportedly showing only a small hiatal hernia and colonoscopy which was unable to be completed but no evidence of IBD. In her imaging no intra-or extra hepatic biliary ductal dilatation was noted with a 3 mm common bile duct which is normal as was the spleen and liver. There is no personal or family history of hypertriglyceridemia. With each episode her LFTs remain normal.  Creon may have intermittently provided mild relief, but no reliably so as of lately. No other exacerbating or relieving factors or other associated symptoms.     She underwent an EUS noting some daya-pancreatic stranding, but no findings consistent with chronic pancreatitis, malignancy or pancreatic/biliary ductal abnormalities.No fevers, dark urine or chills.  TG and IgG4 were normal. Genetic testing was unremarkable as well.No vomiting. Nausea is controlled with Zofran. She is able to tolerate a bland diet. She meets criteria with her lipase of >3x ULN and pain consistent with pancreatitis. She is  concerned about undergoing ERCP with manometry to evaluate for pancreatic SOD.      The following portions of the patient's history were reviewed and updated as appropriate: allergies, current medications, past family history, past medical history, past social history, past surgical history and problem list.    HPI  Review of Systems   Respiratory: Negative for apnea and chest tightness.    Cardiovascular: Negative for chest pain and leg swelling.   Gastrointestinal: Positive for abdominal pain and nausea.       Objective:      Physical Exam   Constitutional: She is oriented to person, place, and time. She appears well-developed and well-nourished.   HENT:   Head: Normocephalic and atraumatic.   Eyes: Conjunctivae are normal. No scleral icterus.   Neck: Normal range of motion. Neck supple. No tracheal deviation present. No thyromegaly present.   Pulmonary/Chest: Effort normal. No respiratory distress.   Abdominal:   ttp in mid-epigastrium   Neurological: She is alert and oriented to person, place, and time.   Skin: Skin is warm and dry. No rash noted. She is not diaphoretic. No erythema.   Psychiatric: She has a normal mood and affect. Her behavior is normal.   Nursing note and vitals reviewed.      Labs; reviewed  Assessment:       1. Abdominal pain, unspecified location    2. Pancreatitis, recurrent        Plan:   1. Will look into additional causes of symptoms and elevations in pancreatic enzymes  2. Celiac serologies, GES

## 2017-09-29 LAB — TTG IGA SER IA-ACNC: 4 UNITS

## 2017-10-04 ENCOUNTER — PATIENT MESSAGE (OUTPATIENT)
Dept: GASTROENTEROLOGY | Facility: CLINIC | Age: 37
End: 2017-10-04

## 2017-10-04 ENCOUNTER — TELEPHONE (OUTPATIENT)
Dept: GASTROENTEROLOGY | Facility: CLINIC | Age: 37
End: 2017-10-04

## 2017-10-04 NOTE — TELEPHONE ENCOUNTER
----- Message from Ketan Lara MD sent at 10/4/2017  6:20 AM CDT -----  Celiac serologies negative, gastric emptying study with a mild initial delay though the 4 hr result I found within normal range. Any change in her symptoms

## 2017-10-06 RX ORDER — PANCRELIPASE 24000; 76000; 120000 [USP'U]/1; [USP'U]/1; [USP'U]/1
CAPSULE, DELAYED RELEASE PELLETS ORAL
Qty: 120 CAPSULE | Refills: 2 | Status: SHIPPED | OUTPATIENT
Start: 2017-10-06 | End: 2018-04-17 | Stop reason: SDUPTHER

## 2017-11-30 ENCOUNTER — PATIENT MESSAGE (OUTPATIENT)
Dept: GASTROENTEROLOGY | Facility: CLINIC | Age: 37
End: 2017-11-30

## 2017-12-20 ENCOUNTER — OFFICE VISIT (OUTPATIENT)
Dept: GASTROENTEROLOGY | Facility: CLINIC | Age: 37
End: 2017-12-20
Payer: COMMERCIAL

## 2017-12-20 VITALS
BODY MASS INDEX: 19.82 KG/M2 | SYSTOLIC BLOOD PRESSURE: 139 MMHG | DIASTOLIC BLOOD PRESSURE: 65 MMHG | WEIGHT: 88.38 LBS | HEART RATE: 72 BPM

## 2017-12-20 PROCEDURE — 99212 OFFICE O/P EST SF 10 MIN: CPT | Mod: S$GLB,,, | Performed by: INTERNAL MEDICINE

## 2017-12-20 PROCEDURE — 99999 PR PBB SHADOW E&M-EST. PATIENT-LVL II: CPT | Mod: PBBFAC,,, | Performed by: INTERNAL MEDICINE

## 2017-12-20 NOTE — PROGRESS NOTES
Subjective:       Patient ID: Gogo Muro is a 37 y.o. female.    Chief Complaint: Follow-up    This is a 37-year-old female who presents for a f/u visit regarding suspected recurrent, idiopathic pancreatitis. Her symptoms began approximately 15 years ago when she was hospitalized in [place] for abdominal pain with suspected idiopathic pancreatitis. There is no history of alcohol use (prior or current) and multiple ultrasounds over the years were reportedly normal without evidence of biliary ductal dilation, cholelithiasis or gallbladder sludge. In addition a CT with contrast and MRCP were normal. She has a chronically elevated amylase and lipase less than 3 times upper limit of normal. The symptoms she describes are epigastric pain which can radiate to the lower abdomen and some intermittent loose stools. The pain can be mild-severe, last for hours-days with some improvement by going to a liquid diet. She had an upper endoscopy which was done reportedly showing only a small hiatal hernia and colonoscopy which was unable to be completed but no evidence of IBD. In her imaging no intra-or extra hepatic biliary ductal dilatation was noted with a 3 mm common bile duct which is normal as was the spleen and liver. There is no personal or family history of hypertriglyceridemia. With each episode her LFTs remain normal. She has taken Creon with some improvement. No other exacerbating or relieving factors or other associated symptoms. More recently she has not noted pain, improved appetite and less early satiety.      She underwent an EUS noting some daya-pancreatic stranding, but no findings consistent with chronic pancreatitis, malignancy or pancreatic/biliary ductal abnormalities.No fevers, dark urine or chills.  TG and IgG4 were normal. Genetic testing was unremarkable as well.No vomiting. Nausea is controlled with Zofran. She is able to tolerate a bland diet. She meets criteria with her lipase of >3x ULN and pain  consistent with pancreatitis. We have discussed possible ERCP with manometry to evaluate for pancreatic SOD. She also reports recent ob/gyn visit which was concerning for endometriosis with possible ex lap/PATRICK possible oophorectomy.      The following portions of the patient's history were reviewed and updated as appropriate: allergies, current medications, past family history, past medical history, past social history, past surgical history and problem list.    HPI  Review of Systems   Constitutional: Negative for appetite change and unexpected weight change.   Gastrointestinal: Negative for abdominal distention and abdominal pain.       Objective:      Physical Exam   Constitutional: She is oriented to person, place, and time. She appears well-developed and well-nourished.   HENT:   Head: Normocephalic and atraumatic.   Eyes: Conjunctivae are normal. No scleral icterus.   Neck: Normal range of motion. Neck supple. No tracheal deviation present. No thyromegaly present.   Pulmonary/Chest: Effort normal. No respiratory distress.   Neurological: She is alert and oriented to person, place, and time.   Skin: Skin is warm and dry. No rash noted. She is not diaphoretic. No erythema.   Psychiatric: She has a normal mood and affect. Her behavior is normal.   Nursing note and vitals reviewed.      Labs reviewee  Assessment:       1. Pancreatitis, recurrent        Plan:   1. Discussed daya-operative risk regarding flaring of her symptoms  2. Will request surgical report regarding intra-abdominal findings, possibility of endometriosis

## 2018-09-24 ENCOUNTER — PATIENT MESSAGE (OUTPATIENT)
Dept: GASTROENTEROLOGY | Facility: CLINIC | Age: 38
End: 2018-09-24

## 2018-10-04 RX ORDER — PANCRELIPASE 24000; 76000; 120000 [USP'U]/1; [USP'U]/1; [USP'U]/1
CAPSULE, DELAYED RELEASE PELLETS ORAL
Qty: 120 CAPSULE | Refills: 0 | Status: SHIPPED | OUTPATIENT
Start: 2018-10-04 | End: 2018-12-28 | Stop reason: SDUPTHER

## 2018-12-28 RX ORDER — PANCRELIPASE 24000; 76000; 120000 [USP'U]/1; [USP'U]/1; [USP'U]/1
CAPSULE, DELAYED RELEASE PELLETS ORAL
Qty: 120 CAPSULE | Refills: 0 | Status: SHIPPED | OUTPATIENT
Start: 2018-12-28 | End: 2022-01-26

## 2019-09-25 ENCOUNTER — OFFICE VISIT (OUTPATIENT)
Dept: GASTROENTEROLOGY | Facility: CLINIC | Age: 39
End: 2019-09-25
Payer: COMMERCIAL

## 2019-09-25 ENCOUNTER — LAB VISIT (OUTPATIENT)
Dept: LAB | Facility: HOSPITAL | Age: 39
End: 2019-09-25
Attending: INTERNAL MEDICINE
Payer: COMMERCIAL

## 2019-09-25 VITALS
HEART RATE: 92 BPM | WEIGHT: 89.94 LBS | DIASTOLIC BLOOD PRESSURE: 68 MMHG | BODY MASS INDEX: 20.17 KG/M2 | SYSTOLIC BLOOD PRESSURE: 109 MMHG

## 2019-09-25 DIAGNOSIS — R79.89 LFT ELEVATION: ICD-10-CM

## 2019-09-25 DIAGNOSIS — R79.89 LFT ELEVATION: Primary | ICD-10-CM

## 2019-09-25 DIAGNOSIS — R10.10 UPPER ABDOMINAL PAIN: ICD-10-CM

## 2019-09-25 LAB
ALBUMIN SERPL BCP-MCNC: 4.4 G/DL (ref 3.5–5.2)
ALP SERPL-CCNC: 52 U/L (ref 55–135)
ALT SERPL W/O P-5'-P-CCNC: 10 U/L (ref 10–44)
AST SERPL-CCNC: 18 U/L (ref 10–40)
BILIRUB DIRECT SERPL-MCNC: 0.3 MG/DL (ref 0.1–0.3)
BILIRUB DIRECT SERPL-MCNC: 0.3 MG/DL (ref 0.1–0.3)
BILIRUB SERPL-MCNC: 0.7 MG/DL (ref 0.1–1)
PROT SERPL-MCNC: 7.6 G/DL (ref 6–8.4)

## 2019-09-25 PROCEDURE — 36415 COLL VENOUS BLD VENIPUNCTURE: CPT

## 2019-09-25 PROCEDURE — 3008F PR BODY MASS INDEX (BMI) DOCUMENTED: ICD-10-PCS | Mod: CPTII,S$GLB,, | Performed by: INTERNAL MEDICINE

## 2019-09-25 PROCEDURE — 80076 HEPATIC FUNCTION PANEL: CPT

## 2019-09-25 PROCEDURE — 99214 PR OFFICE/OUTPT VISIT, EST, LEVL IV, 30-39 MIN: ICD-10-PCS | Mod: S$GLB,,, | Performed by: INTERNAL MEDICINE

## 2019-09-25 PROCEDURE — 99214 OFFICE O/P EST MOD 30 MIN: CPT | Mod: S$GLB,,, | Performed by: INTERNAL MEDICINE

## 2019-09-25 PROCEDURE — 99999 PR PBB SHADOW E&M-EST. PATIENT-LVL III: CPT | Mod: PBBFAC,,, | Performed by: INTERNAL MEDICINE

## 2019-09-25 PROCEDURE — 99999 PR PBB SHADOW E&M-EST. PATIENT-LVL III: ICD-10-PCS | Mod: PBBFAC,,, | Performed by: INTERNAL MEDICINE

## 2019-09-25 PROCEDURE — 3008F BODY MASS INDEX DOCD: CPT | Mod: CPTII,S$GLB,, | Performed by: INTERNAL MEDICINE

## 2019-09-25 NOTE — PROGRESS NOTES
Subjective:       Patient ID: Gogo Muro is a 39 y.o. female.    Chief Complaint: Advice Only and Abdominal Pain    This is a 39-year-old female well known to me initially presenting with suspected recurrent, idiopathic pancreatitis.  The she had a long, extensive workup for this and over the past 1 year her symptoms have been remarkably improved.  This did seem to correlate to improvement in treatment of her migraines for which she was on Aimovig.  After this medication was discontinued she noted some recurrence of her abdominal symptoms, 2 episodes of intense, upper abdominal, postprandial cramping which lasted for hours and was similar to prior episodes.  Recent laboratory data she reports showed a mild elevation in total bilirubin with a prior normal lab level.    The following portions of the patient's history were reviewed and updated as appropriate: allergies, current medications, past family history, past medical history, past social history, past surgical history and problem list.    (Portions of this note were dictated using voice recognition software and may contain dictation related errors in spelling/grammar/syntax not found on text review)  HPI  Review of Systems   Constitutional: Negative for appetite change and unexpected weight change.   Respiratory: Negative for apnea and chest tightness.    Cardiovascular: Negative for chest pain and palpitations.   Gastrointestinal: Positive for abdominal pain. Negative for abdominal distention.   Neurological: Positive for headaches. Negative for facial asymmetry.         Objective:      Physical Exam   Constitutional: She is oriented to person, place, and time. She appears well-developed and well-nourished.   HENT:   Head: Normocephalic and atraumatic.   Eyes: Conjunctivae are normal. No scleral icterus.   Neck: Normal range of motion. Neck supple. No tracheal deviation present. No thyromegaly present.   Pulmonary/Chest: Effort normal. No respiratory  distress.   Neurological: She is alert and oriented to person, place, and time.   Skin: Skin is warm and dry. No rash noted. She is not diaphoretic. No erythema.   Psychiatric: She has a normal mood and affect. Her behavior is normal.   Nursing note and vitals reviewed.        Labs/imaging; reviewed  Assessment:       1. LFT elevation    2. Upper abdominal pain        Plan:   1. Constipation was a side effect of Aimovig  2. The given how well her systemic symptoms were improved on treatment for migraine, I think it would be worthwhile to treat her constipation and continue this migraine treatment.  She is going to follow-up with a neurologist this month regarding this and I have sent a prescription in for a trial of Trulance  3. Recheck LFTs, fractionate

## 2019-12-01 ENCOUNTER — PATIENT MESSAGE (OUTPATIENT)
Dept: GASTROENTEROLOGY | Facility: CLINIC | Age: 39
End: 2019-12-01

## 2019-12-04 ENCOUNTER — OFFICE VISIT (OUTPATIENT)
Dept: GASTROENTEROLOGY | Facility: CLINIC | Age: 39
End: 2019-12-04
Payer: COMMERCIAL

## 2019-12-04 ENCOUNTER — LAB VISIT (OUTPATIENT)
Dept: LAB | Facility: HOSPITAL | Age: 39
End: 2019-12-04
Attending: INTERNAL MEDICINE
Payer: COMMERCIAL

## 2019-12-04 VITALS
BODY MASS INDEX: 19.72 KG/M2 | HEART RATE: 72 BPM | SYSTOLIC BLOOD PRESSURE: 113 MMHG | DIASTOLIC BLOOD PRESSURE: 76 MMHG | WEIGHT: 87.94 LBS

## 2019-12-04 DIAGNOSIS — R10.10 UPPER ABDOMINAL PAIN: Primary | ICD-10-CM

## 2019-12-04 DIAGNOSIS — R10.10 UPPER ABDOMINAL PAIN: ICD-10-CM

## 2019-12-04 LAB
ALBUMIN SERPL BCP-MCNC: 4.2 G/DL (ref 3.5–5.2)
ALP SERPL-CCNC: 50 U/L (ref 55–135)
ALT SERPL W/O P-5'-P-CCNC: 11 U/L (ref 10–44)
ANION GAP SERPL CALC-SCNC: 8 MMOL/L (ref 8–16)
AST SERPL-CCNC: 18 U/L (ref 10–40)
BASOPHILS # BLD AUTO: 0.03 K/UL (ref 0–0.2)
BASOPHILS NFR BLD: 0.5 % (ref 0–1.9)
BILIRUB SERPL-MCNC: 0.9 MG/DL (ref 0.1–1)
BUN SERPL-MCNC: 8 MG/DL (ref 6–20)
CALCIUM SERPL-MCNC: 9.3 MG/DL (ref 8.7–10.5)
CHLORIDE SERPL-SCNC: 108 MMOL/L (ref 95–110)
CO2 SERPL-SCNC: 24 MMOL/L (ref 23–29)
CREAT SERPL-MCNC: 0.8 MG/DL (ref 0.5–1.4)
CRP SERPL-MCNC: 0.3 MG/L (ref 0–8.2)
DIFFERENTIAL METHOD: NORMAL
EOSINOPHIL # BLD AUTO: 0.1 K/UL (ref 0–0.5)
EOSINOPHIL NFR BLD: 1.4 % (ref 0–8)
ERYTHROCYTE [DISTWIDTH] IN BLOOD BY AUTOMATED COUNT: 11.9 % (ref 11.5–14.5)
EST. GFR  (AFRICAN AMERICAN): >60 ML/MIN/1.73 M^2
EST. GFR  (NON AFRICAN AMERICAN): >60 ML/MIN/1.73 M^2
GLUCOSE SERPL-MCNC: 89 MG/DL (ref 70–110)
HCT VFR BLD AUTO: 39.2 % (ref 37–48.5)
HGB BLD-MCNC: 13 G/DL (ref 12–16)
LIPASE SERPL-CCNC: 84 U/L (ref 4–60)
LYMPHOCYTES # BLD AUTO: 1.7 K/UL (ref 1–4.8)
LYMPHOCYTES NFR BLD: 28.4 % (ref 18–48)
MCH RBC QN AUTO: 30.6 PG (ref 27–31)
MCHC RBC AUTO-ENTMCNC: 33.2 G/DL (ref 32–36)
MCV RBC AUTO: 92 FL (ref 82–98)
MONOCYTES # BLD AUTO: 0.4 K/UL (ref 0.3–1)
MONOCYTES NFR BLD: 6.9 % (ref 4–15)
NEUTROPHILS # BLD AUTO: 3.7 K/UL (ref 1.8–7.7)
NEUTROPHILS NFR BLD: 62.8 % (ref 38–73)
PLATELET # BLD AUTO: 219 K/UL (ref 150–350)
PMV BLD AUTO: 10.7 FL (ref 9.2–12.9)
POTASSIUM SERPL-SCNC: 4.7 MMOL/L (ref 3.5–5.1)
PROT SERPL-MCNC: 7.2 G/DL (ref 6–8.4)
RBC # BLD AUTO: 4.25 M/UL (ref 4–5.4)
SODIUM SERPL-SCNC: 140 MMOL/L (ref 136–145)
WBC # BLD AUTO: 5.81 K/UL (ref 3.9–12.7)

## 2019-12-04 PROCEDURE — 36415 COLL VENOUS BLD VENIPUNCTURE: CPT

## 2019-12-04 PROCEDURE — 99999 PR PBB SHADOW E&M-EST. PATIENT-LVL III: CPT | Mod: PBBFAC,,, | Performed by: INTERNAL MEDICINE

## 2019-12-04 PROCEDURE — 99999 PR PBB SHADOW E&M-EST. PATIENT-LVL III: ICD-10-PCS | Mod: PBBFAC,,, | Performed by: INTERNAL MEDICINE

## 2019-12-04 PROCEDURE — 3008F BODY MASS INDEX DOCD: CPT | Mod: CPTII,S$GLB,, | Performed by: INTERNAL MEDICINE

## 2019-12-04 PROCEDURE — 99212 OFFICE O/P EST SF 10 MIN: CPT | Mod: S$GLB,,, | Performed by: INTERNAL MEDICINE

## 2019-12-04 PROCEDURE — 99212 PR OFFICE/OUTPT VISIT, EST, LEVL II, 10-19 MIN: ICD-10-PCS | Mod: S$GLB,,, | Performed by: INTERNAL MEDICINE

## 2019-12-04 PROCEDURE — 80053 COMPREHEN METABOLIC PANEL: CPT

## 2019-12-04 PROCEDURE — 86140 C-REACTIVE PROTEIN: CPT

## 2019-12-04 PROCEDURE — 85025 COMPLETE CBC W/AUTO DIFF WBC: CPT

## 2019-12-04 PROCEDURE — 83690 ASSAY OF LIPASE: CPT

## 2019-12-04 PROCEDURE — 3008F PR BODY MASS INDEX (BMI) DOCUMENTED: ICD-10-PCS | Mod: CPTII,S$GLB,, | Performed by: INTERNAL MEDICINE

## 2019-12-04 RX ORDER — TRAMADOL HYDROCHLORIDE 50 MG/1
25 TABLET ORAL EVERY 6 HOURS
Qty: 14 TABLET | Refills: 0 | Status: SHIPPED | OUTPATIENT
Start: 2019-12-04 | End: 2019-12-11

## 2019-12-04 NOTE — PROGRESS NOTES
Subjective:       Patient ID: Gogo Muro is a 39 y.o. female.    Chief Complaint: Abdominal pain    This is a 39-year-old female here for a follow-up visit.  She has noted recurrence of her upper abdominal discomfort, without particular exacerbating or relieving factors this time.  She denies any dark urine, jaundice.  No recent changes in her medications.  No other exacerbating or relieving factors or other associated symptoms.    The following portions of the patient's history were reviewed and updated as appropriate: allergies, current medications, past family history, past medical history, past social history, past surgical history and problem list.    (Portions of this note were dictated using voice recognition software and may contain dictation related errors in spelling/grammar/syntax not found on text review)  HPI  Review of Systems   Constitutional: Negative for appetite change and unexpected weight change.   Respiratory: Negative for apnea and chest tightness.    Cardiovascular: Negative for chest pain and palpitations.   Gastrointestinal: Positive for abdominal pain. Negative for abdominal distention.   Neurological: Positive for headaches. Negative for facial asymmetry.         Objective:      Physical Exam   Constitutional: She is oriented to person, place, and time. She appears well-developed and well-nourished.   HENT:   Head: Normocephalic and atraumatic.   Eyes: Conjunctivae are normal. No scleral icterus.   Neck: Normal range of motion. Neck supple. No tracheal deviation present.   Pulmonary/Chest: Effort normal. No respiratory distress.   Abdominal: Soft. She exhibits no distension. There is no tenderness.   Neurological: She is alert and oriented to person, place, and time.   Skin: Skin is warm and dry. No rash noted. She is not diaphoretic. No erythema.   Psychiatric: She has a normal mood and affect. Her behavior is normal.   Nursing note and vitals reviewed.        Labs/imaging;  reviewed  Assessment:       1. Upper abdominal pain        Plan:   1. Further imaging: CT, repeat labs  2. Consider CFTR testing

## 2019-12-06 DIAGNOSIS — R10.10 UPPER ABDOMINAL PAIN: Primary | ICD-10-CM

## 2019-12-06 RX ORDER — ONDANSETRON HYDROCHLORIDE 8 MG/1
8 TABLET, FILM COATED ORAL EVERY 6 HOURS PRN
Qty: 90 TABLET | Refills: 0 | Status: SHIPPED | OUTPATIENT
Start: 2019-12-06 | End: 2020-09-18 | Stop reason: SDUPTHER

## 2020-01-15 ENCOUNTER — PATIENT MESSAGE (OUTPATIENT)
Dept: GASTROENTEROLOGY | Facility: CLINIC | Age: 40
End: 2020-01-15

## 2020-01-21 NOTE — TELEPHONE ENCOUNTER
Called pt via telephone, doing better from a symptom standpoint. If symptoms recur will consider CT imaging and discussed CFTR testing as well

## 2020-09-18 DIAGNOSIS — R10.10 UPPER ABDOMINAL PAIN: ICD-10-CM

## 2020-09-18 RX ORDER — ONDANSETRON HYDROCHLORIDE 8 MG/1
8 TABLET, FILM COATED ORAL EVERY 6 HOURS PRN
Qty: 90 TABLET | Refills: 0 | Status: SHIPPED | OUTPATIENT
Start: 2020-09-18 | End: 2022-04-11 | Stop reason: SDUPTHER

## 2021-10-25 ENCOUNTER — TELEPHONE (OUTPATIENT)
Dept: OBSTETRICS AND GYNECOLOGY | Facility: CLINIC | Age: 41
End: 2021-10-25
Payer: COMMERCIAL

## 2022-01-04 ENCOUNTER — PATIENT MESSAGE (OUTPATIENT)
Dept: OBSTETRICS AND GYNECOLOGY | Facility: CLINIC | Age: 42
End: 2022-01-04
Payer: COMMERCIAL

## 2022-01-04 DIAGNOSIS — Z12.31 SCREENING MAMMOGRAM FOR HIGH-RISK PATIENT: Primary | ICD-10-CM

## 2022-01-26 ENCOUNTER — HOSPITAL ENCOUNTER (OUTPATIENT)
Dept: RADIOLOGY | Facility: CLINIC | Age: 42
Discharge: HOME | End: 2022-01-26
Payer: COMMERCIAL

## 2022-01-26 ENCOUNTER — LAB VISIT (OUTPATIENT)
Dept: LAB | Facility: CLINIC | Age: 42
End: 2022-01-26
Payer: COMMERCIAL

## 2022-01-26 ENCOUNTER — OFFICE VISIT (OUTPATIENT)
Dept: OBSTETRICS AND GYNECOLOGY | Facility: CLINIC | Age: 42
End: 2022-01-26
Payer: COMMERCIAL

## 2022-01-26 VITALS
HEIGHT: 56 IN | DIASTOLIC BLOOD PRESSURE: 80 MMHG | BODY MASS INDEX: 19.8 KG/M2 | SYSTOLIC BLOOD PRESSURE: 122 MMHG | WEIGHT: 88 LBS

## 2022-01-26 VITALS — BODY MASS INDEX: 19.8 KG/M2 | HEIGHT: 56 IN | WEIGHT: 88 LBS

## 2022-01-26 DIAGNOSIS — Z12.31 SCREENING MAMMOGRAM FOR HIGH-RISK PATIENT: ICD-10-CM

## 2022-01-26 DIAGNOSIS — Z79.890 HORMONE REPLACEMENT THERAPY: ICD-10-CM

## 2022-01-26 DIAGNOSIS — N95.1 MENOPAUSAL SYMPTOMS: ICD-10-CM

## 2022-01-26 DIAGNOSIS — Z00.00 BLOOD TESTS FOR ROUTINE GENERAL PHYSICAL EXAMINATION: Primary | ICD-10-CM

## 2022-01-26 DIAGNOSIS — Z00.00 BLOOD TESTS FOR ROUTINE GENERAL PHYSICAL EXAMINATION: ICD-10-CM

## 2022-01-26 DIAGNOSIS — Z86.69 HX OF MIGRAINE HEADACHES: ICD-10-CM

## 2022-01-26 LAB
CHOLEST SERPL-MCNC: 199 MG/DL (ref 120–199)
CHOLEST/HDLC SERPL: 2.6 {RATIO} (ref 2–5)
GLUCOSE SERPL-MCNC: 70 MG/DL (ref 70–110)
HDLC SERPL-MCNC: 78 MG/DL (ref 40–75)
HDLC SERPL: 39.2 % (ref 20–50)
LDLC SERPL CALC-MCNC: 106.4 MG/DL (ref 63–159)
NONHDLC SERPL-MCNC: 121 MG/DL
TRIGL SERPL-MCNC: 73 MG/DL (ref 30–150)

## 2022-01-26 PROCEDURE — 3074F PR MOST RECENT SYSTOLIC BLOOD PRESSURE < 130 MM HG: ICD-10-PCS | Mod: S$GLB,,, | Performed by: OBSTETRICS & GYNECOLOGY

## 2022-01-26 PROCEDURE — 36415 PR COLLECTION VENOUS BLOOD,VENIPUNCTURE: ICD-10-PCS | Mod: ,,, | Performed by: OBSTETRICS & GYNECOLOGY

## 2022-01-26 PROCEDURE — 3008F BODY MASS INDEX DOCD: CPT | Mod: S$GLB,,, | Performed by: OBSTETRICS & GYNECOLOGY

## 2022-01-26 PROCEDURE — 1159F MED LIST DOCD IN RCRD: CPT | Mod: S$GLB,,, | Performed by: OBSTETRICS & GYNECOLOGY

## 2022-01-26 PROCEDURE — 36415 COLL VENOUS BLD VENIPUNCTURE: CPT | Mod: ,,, | Performed by: OBSTETRICS & GYNECOLOGY

## 2022-01-26 PROCEDURE — 82947 ASSAY GLUCOSE BLOOD QUANT: CPT | Performed by: OBSTETRICS & GYNECOLOGY

## 2022-01-26 PROCEDURE — 77067 MAMMO DIGITAL SCREENING BILAT WITH TOMO: ICD-10-PCS | Mod: S$GLB,,, | Performed by: RADIOLOGY

## 2022-01-26 PROCEDURE — 1160F PR REVIEW ALL MEDS BY PRESCRIBER/CLIN PHARMACIST DOCUMENTED: ICD-10-PCS | Mod: S$GLB,,, | Performed by: OBSTETRICS & GYNECOLOGY

## 2022-01-26 PROCEDURE — 99396 PR PREVENTIVE VISIT,EST,40-64: ICD-10-PCS | Mod: S$GLB,,, | Performed by: OBSTETRICS & GYNECOLOGY

## 2022-01-26 PROCEDURE — 3074F SYST BP LT 130 MM HG: CPT | Mod: S$GLB,,, | Performed by: OBSTETRICS & GYNECOLOGY

## 2022-01-26 PROCEDURE — 1160F RVW MEDS BY RX/DR IN RCRD: CPT | Mod: S$GLB,,, | Performed by: OBSTETRICS & GYNECOLOGY

## 2022-01-26 PROCEDURE — 77063 MAMMO DIGITAL SCREENING BILAT WITH TOMO: ICD-10-PCS | Mod: S$GLB,,, | Performed by: RADIOLOGY

## 2022-01-26 PROCEDURE — 77063 BREAST TOMOSYNTHESIS BI: CPT | Mod: S$GLB,,, | Performed by: RADIOLOGY

## 2022-01-26 PROCEDURE — 3079F PR MOST RECENT DIASTOLIC BLOOD PRESSURE 80-89 MM HG: ICD-10-PCS | Mod: S$GLB,,, | Performed by: OBSTETRICS & GYNECOLOGY

## 2022-01-26 PROCEDURE — 77067 SCR MAMMO BI INCL CAD: CPT | Mod: S$GLB,,, | Performed by: RADIOLOGY

## 2022-01-26 PROCEDURE — 80061 LIPID PANEL: CPT | Performed by: OBSTETRICS & GYNECOLOGY

## 2022-01-26 PROCEDURE — 99396 PREV VISIT EST AGE 40-64: CPT | Mod: S$GLB,,, | Performed by: OBSTETRICS & GYNECOLOGY

## 2022-01-26 PROCEDURE — 3079F DIAST BP 80-89 MM HG: CPT | Mod: S$GLB,,, | Performed by: OBSTETRICS & GYNECOLOGY

## 2022-01-26 PROCEDURE — 3008F PR BODY MASS INDEX (BMI) DOCUMENTED: ICD-10-PCS | Mod: S$GLB,,, | Performed by: OBSTETRICS & GYNECOLOGY

## 2022-01-26 PROCEDURE — 1159F PR MEDICATION LIST DOCUMENTED IN MEDICAL RECORD: ICD-10-PCS | Mod: S$GLB,,, | Performed by: OBSTETRICS & GYNECOLOGY

## 2022-01-26 RX ORDER — BUTALBITAL, ACETAMINOPHEN AND CAFFEINE 50; 325; 40 MG/1; MG/1; MG/1
1 TABLET ORAL EVERY 6 HOURS PRN
COMMUNITY
Start: 2021-10-25 | End: 2022-01-26 | Stop reason: SDUPTHER

## 2022-01-26 RX ORDER — FAMOTIDINE 40 MG/1
40 TABLET, FILM COATED ORAL DAILY
COMMUNITY
End: 2022-08-16

## 2022-01-26 RX ORDER — ESTRADIOL 0.5 MG/1
0.5 TABLET ORAL DAILY
COMMUNITY
Start: 2022-01-18 | End: 2022-01-26

## 2022-01-26 RX ORDER — BUTALBITAL, ACETAMINOPHEN AND CAFFEINE 50; 325; 40 MG/1; MG/1; MG/1
1 TABLET ORAL EVERY 6 HOURS PRN
Qty: 30 TABLET | Refills: 5 | Status: SHIPPED | OUTPATIENT
Start: 2022-01-26 | End: 2022-09-27 | Stop reason: SDUPTHER

## 2022-01-26 RX ORDER — PROGESTERONE 100 MG/1
100 CAPSULE ORAL NIGHTLY
Qty: 30 CAPSULE | Refills: 11 | Status: SHIPPED | OUTPATIENT
Start: 2022-01-26 | End: 2022-08-16

## 2022-01-26 RX ORDER — ESTRADIOL 2 MG/1
2 TABLET ORAL DAILY
Qty: 30 TABLET | Refills: 11 | Status: SHIPPED | OUTPATIENT
Start: 2022-01-26 | End: 2022-08-16

## 2022-01-26 RX ORDER — LORATADINE 10 MG/1
10 TABLET ORAL DAILY
COMMUNITY
End: 2022-08-16

## 2022-01-26 NOTE — PROGRESS NOTES
CC: Well woman exam    Gogo Muro is a 41 y.o. female  presents for well woman exam.  LMP: No LMP recorded. Patient has had a hysterectomy..   Patients last pap was unknown, hx of hyst.  Last wellness labs were done 1 year ago.  Last mammogram was 2022.   Primary care is- Dr. Herrera.  SBE- Yes. Birth control- Hyst.  C/o increased menopausal symptoms.  She is having significant hot flashes and sleep problems and consequently also has mood swings and fatigue.  The sound like typical menopausal low estrogen and low progesterone symptoms and we will try to increase her estrogen dosage and also add progesterone to her hormone replacement therapy.    Chief Complaint   Patient presents with    Well Woman     Pt here for WWE. Needs med refills. No pap.    Menopause     Also wants to discuss increased menopausal symptoms. States her hot flashes, moodiness, and fatigue have increased since her last visit.        Past Medical History:   Diagnosis Date    Hx of migraines     Pancreatitis     PONV (postoperative nausea and vomiting)      Past Surgical History:   Procedure Laterality Date     SECTION      x 3    HYSTERECTOMY       Social History     Socioeconomic History    Marital status:    Tobacco Use    Smoking status: Never Smoker    Smokeless tobacco: Never Used   Substance and Sexual Activity    Alcohol use: No    Drug use: No    Sexual activity: Yes     Partners: Male     Birth control/protection: See Surgical Hx     Comment: Hyst   Social History Narrative    ** Merged History Encounter **          Family History   Problem Relation Age of Onset    Hypertension Maternal Grandmother     Deep vein thrombosis Maternal Grandmother     Cancer Maternal Grandfather         Lung    Deep vein thrombosis Father     Stroke Brother      OB History        3    Para   3    Term   3            AB        Living   3       SAB        IAB        Ectopic        Multiple         "Live Births                     Ht 4' 8" (1.422 m)   Wt 39.9 kg (88 lb)   BMI 19.73 kg/m²       ROS:  GENERAL: Denies weight gain or weight loss. Feeling well overall.   SKIN: Denies rash or lesions.   HEAD: Denies head injury or headache.   NODES: Denies enlarged lymph nodes.   CHEST: Denies chest pain or shortness of breath.   CARDIOVASCULAR: Denies palpitations or left sided chest pain.   ABDOMEN: No abdominal pain, constipation, diarrhea, nausea, vomiting or rectal bleeding.   URINARY: No frequency, dysuria, hematuria, or burning on urination.  REPRODUCTIVE: See HPI.   BREASTS: The patient performs breast self-examination and denies pain, lumps, or nipple discharge.   HEMATOLOGIC: No easy bruisability or excessive bleeding.   MUSCULOSKELETAL: Denies joint pain or swelling.   NEUROLOGIC: Denies syncope or weakness.   PSYCHIATRIC: Denies depression, anxiety or mood swings.    PHYSICAL EXAM:  APPEARANCE: Well nourished, well developed, in no acute distress.  AFFECT: WNL, alert and oriented x 3  SKIN: No acne or hirsutism  NECK: Neck symmetric without masses or thyromegaly  NODES: No inguinal, cervical, or axillary lymph node enlargement  CHEST: Good respiratory effect  ABDOMEN: Soft.  No tenderness or masses.  No hernias.  BREASTS: Symmetrical, no skin changes or visible lesions.  No palpable masses, nipple discharge bilaterally.  PELVIC: Normal external genitalia without lesions.  Normal urethral meatus.  Vagina without lesions or discharge.  Cervix without lesions, discharge or tenderness.  No significant cystocele or rectocele.  Bimanual exam shows uterus to be normal size, regular, mobile and nontender.  Adnexa without masses or tenderness.    EXTREMITIES: No edema.    There are no diagnoses linked to this encounter.  Assessment:  Normal wellness exam.  Menopausal symptoms      Patient was counseled today on A.C.S. Pap guidelines and recommendations for yearly pelvic exams, mammograms and monthly self breast " exams; to see her PCP for other health maintenance.     No follow-ups on file.    .  Plan:  Will increase estradiol dosage and also add Prometrium and will refill the patient's Fioricet as she requested for her hormone related migraines.  Recommend yearly with mammo and wellness exam

## 2022-04-11 ENCOUNTER — OFFICE VISIT (OUTPATIENT)
Dept: FAMILY MEDICINE | Facility: CLINIC | Age: 42
End: 2022-04-11
Payer: COMMERCIAL

## 2022-04-11 ENCOUNTER — TELEPHONE (OUTPATIENT)
Dept: FAMILY MEDICINE | Facility: CLINIC | Age: 42
End: 2022-04-11
Payer: COMMERCIAL

## 2022-04-11 VITALS
HEART RATE: 79 BPM | TEMPERATURE: 99 F | DIASTOLIC BLOOD PRESSURE: 57 MMHG | HEIGHT: 56 IN | WEIGHT: 87.44 LBS | BODY MASS INDEX: 19.67 KG/M2 | RESPIRATION RATE: 16 BRPM | SYSTOLIC BLOOD PRESSURE: 110 MMHG | OXYGEN SATURATION: 99 %

## 2022-04-11 DIAGNOSIS — N95.1 MENOPAUSAL SYMPTOMS: ICD-10-CM

## 2022-04-11 DIAGNOSIS — R10.10 UPPER ABDOMINAL PAIN: ICD-10-CM

## 2022-04-11 DIAGNOSIS — G43.109 MIGRAINE WITH AURA AND WITHOUT STATUS MIGRAINOSUS, NOT INTRACTABLE: ICD-10-CM

## 2022-04-11 DIAGNOSIS — Z76.89 ESTABLISHING CARE WITH NEW DOCTOR, ENCOUNTER FOR: Primary | ICD-10-CM

## 2022-04-11 PROCEDURE — 3008F PR BODY MASS INDEX (BMI) DOCUMENTED: ICD-10-PCS | Mod: S$GLB,,, | Performed by: FAMILY MEDICINE

## 2022-04-11 PROCEDURE — 99999 PR PBB SHADOW E&M-EST. PATIENT-LVL III: CPT | Mod: PBBFAC,,, | Performed by: FAMILY MEDICINE

## 2022-04-11 PROCEDURE — 3074F PR MOST RECENT SYSTOLIC BLOOD PRESSURE < 130 MM HG: ICD-10-PCS | Mod: S$GLB,,, | Performed by: FAMILY MEDICINE

## 2022-04-11 PROCEDURE — 3008F BODY MASS INDEX DOCD: CPT | Mod: S$GLB,,, | Performed by: FAMILY MEDICINE

## 2022-04-11 PROCEDURE — 1160F PR REVIEW ALL MEDS BY PRESCRIBER/CLIN PHARMACIST DOCUMENTED: ICD-10-PCS | Mod: S$GLB,,, | Performed by: FAMILY MEDICINE

## 2022-04-11 PROCEDURE — 1159F MED LIST DOCD IN RCRD: CPT | Mod: S$GLB,,, | Performed by: FAMILY MEDICINE

## 2022-04-11 PROCEDURE — 3078F DIAST BP <80 MM HG: CPT | Mod: S$GLB,,, | Performed by: FAMILY MEDICINE

## 2022-04-11 PROCEDURE — 99205 OFFICE O/P NEW HI 60 MIN: CPT | Mod: S$GLB,,, | Performed by: FAMILY MEDICINE

## 2022-04-11 PROCEDURE — 3078F PR MOST RECENT DIASTOLIC BLOOD PRESSURE < 80 MM HG: ICD-10-PCS | Mod: S$GLB,,, | Performed by: FAMILY MEDICINE

## 2022-04-11 PROCEDURE — 3074F SYST BP LT 130 MM HG: CPT | Mod: S$GLB,,, | Performed by: FAMILY MEDICINE

## 2022-04-11 PROCEDURE — 99999 PR PBB SHADOW E&M-EST. PATIENT-LVL III: ICD-10-PCS | Mod: PBBFAC,,, | Performed by: FAMILY MEDICINE

## 2022-04-11 PROCEDURE — 1159F PR MEDICATION LIST DOCUMENTED IN MEDICAL RECORD: ICD-10-PCS | Mod: S$GLB,,, | Performed by: FAMILY MEDICINE

## 2022-04-11 PROCEDURE — 99205 PR OFFICE/OUTPT VISIT, NEW, LEVL V, 60-74 MIN: ICD-10-PCS | Mod: S$GLB,,, | Performed by: FAMILY MEDICINE

## 2022-04-11 PROCEDURE — 1160F RVW MEDS BY RX/DR IN RCRD: CPT | Mod: S$GLB,,, | Performed by: FAMILY MEDICINE

## 2022-04-11 RX ORDER — ONDANSETRON HYDROCHLORIDE 8 MG/1
8 TABLET, FILM COATED ORAL EVERY 6 HOURS PRN
Qty: 90 TABLET | Refills: 0 | Status: SHIPPED | OUTPATIENT
Start: 2022-04-11 | End: 2023-09-06 | Stop reason: SDUPTHER

## 2022-04-11 NOTE — PROGRESS NOTES
"  Subjective:       Patient ID: Gogo Muro is a 41 y.o. female.    Chief Complaint: Establish Care and Gastroesophageal Reflux    40yo WF here today to establish care. Patient has a long hx of pancreatitis (idiopathic), followed by Dr. Lara in [place], [place].    Patient states she was told that she might need to get genetic testing for possible cystic fibrosis that only affects the pancreas. She states she was also told that she also could have a bile duct problem that may need stretching or stenting etc.    States she was told that during her last "attack" she had acute idiopathic pancreatitis with signs of chronic pancreatitis.     She has taken creon for about a year when she experienced chronic pancreatitis.    She has not experienced any symptoms since 2019. States she thinks she has symptoms "here and there" and states when that happens, she avoids food, stays hydrated, and has been able to recover that way.    Patient's 38 yo brother had a massive stroke. Patient states he has "something wrong with the LOX gene." Patient states she has had carotid artery scan x2 and abdominal aorta and thoracic aorta scanned and both times both were normal. States she went in to have the CTA, but the staff could not reach a vein. So, the CTA did not get done.          Depression Patient Health Questionnaire 2022   Over the last two weeks how often have you been bothered by little interest or pleasure in doing things 0   Over the last two weeks how often have you been bothered by feeling down, depressed or hopeless 0   PHQ-2 Total Score 0     Review of Systems   All other systems reviewed and are negative.        Past Medical History:   Diagnosis Date    Hx of migraines     Pancreatitis     PONV (postoperative nausea and vomiting)      Past Surgical History:   Procedure Laterality Date     SECTION      x 3    HYSTERECTOMY      2016     Family History   Problem Relation Age of Onset    Hypertension " "Maternal Grandmother     Deep vein thrombosis Maternal Grandmother     Cancer Maternal Grandfather         Lung    Deep vein thrombosis Father     Stroke Brother        Review of patient's allergies indicates:   Allergen Reactions    Ibuprofen Palpitations    Keflex [cephalexin] Rash    Sulfa (sulfonamide antibiotics) Rash       Current Outpatient Medications:     butalbital-acetaminophen-caffeine -40 mg (FIORICET, ESGIC) -40 mg per tablet, Take 1 tablet by mouth every 6 (six) hours as needed., Disp: 30 tablet, Rfl: 5    estradioL (ESTRACE) 2 MG tablet, Take 1 tablet (2 mg total) by mouth once daily., Disp: 30 tablet, Rfl: 11    famotidine (PEPCID) 40 MG tablet, Take 40 mg by mouth once daily., Disp: , Rfl:     loratadine (CLARITIN) 10 mg tablet, Take 10 mg by mouth once daily., Disp: , Rfl:     ondansetron (ZOFRAN) 8 MG tablet, Take 1 tablet (8 mg total) by mouth every 6 (six) hours as needed for Nausea., Disp: 90 tablet, Rfl: 0    progesterone (PROMETRIUM) 100 MG capsule, Take 1 capsule (100 mg total) by mouth nightly., Disp: 30 capsule, Rfl: 11      Objective:      BP (!) 110/57 (BP Location: Right leg, Patient Position: Sitting)   Pulse 79   Temp 98.7 °F (37.1 °C)   Resp 16   Ht 4' 8" (1.422 m)   Wt 39.7 kg (87 lb 6.6 oz)   SpO2 99%   BMI 19.60 kg/m²   Physical Exam  Vitals and nursing note reviewed.   Constitutional:       General: She is not in acute distress.     Appearance: Normal appearance. She is well-developed and normal weight. She is not ill-appearing, toxic-appearing or diaphoretic.   HENT:      Head: Normocephalic and atraumatic.      Right Ear: External ear normal.      Left Ear: External ear normal.   Eyes:      General: No scleral icterus.        Right eye: No discharge.         Left eye: No discharge.      Extraocular Movements: Extraocular movements intact.      Conjunctiva/sclera: Conjunctivae normal.   Neck:      Thyroid: No thyromegaly.      Vascular: No JVD. "      Trachea: No tracheal deviation.   Cardiovascular:      Rate and Rhythm: Normal rate and regular rhythm.      Pulses: Normal pulses.      Heart sounds: Normal heart sounds. No murmur heard.    No friction rub. No gallop.   Pulmonary:      Effort: Pulmonary effort is normal. No respiratory distress.      Breath sounds: Normal breath sounds. No wheezing, rhonchi or rales.   Abdominal:      General: Abdomen is flat.      Palpations: Abdomen is soft.   Musculoskeletal:         General: Normal range of motion.      Cervical back: Normal range of motion and neck supple.      Right lower leg: No edema.      Left lower leg: No edema.   Skin:     General: Skin is warm and dry.      Capillary Refill: Capillary refill takes less than 2 seconds.      Coloration: Skin is not jaundiced or pale.      Findings: No erythema or rash.   Neurological:      General: No focal deficit present.      Mental Status: She is alert and oriented to person, place, and time. Mental status is at baseline.      Motor: No weakness.      Coordination: Coordination normal.      Gait: Gait normal.   Psychiatric:         Mood and Affect: Mood normal.         Behavior: Behavior normal.         Thought Content: Thought content normal.         Judgment: Judgment normal.         Assessment:       1. Establishing care with new doctor, encounter for    2. Menopausal symptoms    3. Pancreatitis, recurrent    4. Upper abdominal pain    5. Migraine with aura and without status migrainosus, not intractable        Plan:       Establishing care with new doctor, encounter for    Menopausal symptoms  -     T4, Free; Future; Expected date: 04/11/2022  -     TSH; Future; Expected date: 04/11/2022  -     CBC Auto Differential; Future; Expected date: 04/11/2022  -     Comprehensive Metabolic Panel; Future; Expected date: 04/11/2022  -     Vitamin B12; Future; Expected date: 04/11/2022  -     Vitamin D; Future; Expected date: 04/11/2022  -     Ferritin; Future;  Expected date: 04/11/2022  -     Iron and TIBC; Future; Expected date: 04/11/2022  -     Magnesium; Future; Expected date: 04/11/2022    Pancreatitis, recurrent  -     Lipase; Future; Expected date: 04/11/2022  -     Amylase; Future; Expected date: 04/11/2022    Upper abdominal pain  -     ondansetron (ZOFRAN) 8 MG tablet; Take 1 tablet (8 mg total) by mouth every 6 (six) hours as needed for Nausea.  Dispense: 90 tablet; Refill: 0    Migraine with aura and without status migrainosus, not intractable  -     Vitamin B12; Future; Expected date: 04/11/2022  -     Vitamin D; Future; Expected date: 04/11/2022  -     Ferritin; Future; Expected date: 04/11/2022  -     Iron and TIBC; Future; Expected date: 04/11/2022  -     Magnesium; Future; Expected date: 04/11/2022      Previous records in Epic were reviewed, including the last 12 months of encounters, imaging, laboratory, and pathology reports.    Strict return precautions reviewed and patient verbalized understanding. Risks, benefits, and alternatives to the plan were reviewed in detail and all questions answered to the patient's satisfaction. Patient agrees to return to clinic or ER if symptoms worsen. 60 minutes total were spent on today's visit, not limited to but including time based on counseling and coordination of care.    Patient instructed that best way to communicate with my office staff is for patient to get on the [facility] epic patient portal to expedite communication and communication issues that may occur.  Patient was given instructions on how to get on the portal.  I encouraged patient to obtain portal access as well.  Ultimately it is up to the patient to obtain access.  Patient voiced understanding.    This note was created using [employer] voice recognition software that occasionally may misinterpret phrases or words.          Follow up in about 3 months (around 7/11/2022) for routine checkup.

## 2022-04-13 DIAGNOSIS — Z11.59 NEED FOR HEPATITIS C SCREENING TEST: ICD-10-CM

## 2022-04-20 ENCOUNTER — LAB VISIT (OUTPATIENT)
Dept: LAB | Facility: HOSPITAL | Age: 42
End: 2022-04-20
Payer: COMMERCIAL

## 2022-04-20 DIAGNOSIS — Z11.59 NEED FOR HEPATITIS C SCREENING TEST: ICD-10-CM

## 2022-04-20 DIAGNOSIS — G43.109 MIGRAINE WITH AURA AND WITHOUT STATUS MIGRAINOSUS, NOT INTRACTABLE: ICD-10-CM

## 2022-04-20 DIAGNOSIS — N95.1 MENOPAUSAL SYMPTOMS: ICD-10-CM

## 2022-04-20 LAB
ALBUMIN SERPL BCP-MCNC: 4.2 G/DL (ref 3.5–5.2)
ALP SERPL-CCNC: 47 U/L (ref 55–135)
ALT SERPL W/O P-5'-P-CCNC: 10 U/L (ref 10–44)
AMYLASE SERPL-CCNC: 223 U/L (ref 20–110)
ANION GAP SERPL CALC-SCNC: 10 MMOL/L (ref 8–16)
AST SERPL-CCNC: 17 U/L (ref 10–40)
BASOPHILS # BLD AUTO: 0.04 K/UL (ref 0–0.2)
BASOPHILS NFR BLD: 0.9 % (ref 0–1.9)
BILIRUB SERPL-MCNC: 0.4 MG/DL (ref 0.1–1)
BUN SERPL-MCNC: 12 MG/DL (ref 6–20)
CALCIUM SERPL-MCNC: 9.1 MG/DL (ref 8.7–10.5)
CHLORIDE SERPL-SCNC: 105 MMOL/L (ref 95–110)
CO2 SERPL-SCNC: 23 MMOL/L (ref 23–29)
CREAT SERPL-MCNC: 0.8 MG/DL (ref 0.5–1.4)
DIFFERENTIAL METHOD: ABNORMAL
EOSINOPHIL # BLD AUTO: 0 K/UL (ref 0–0.5)
EOSINOPHIL NFR BLD: 0.9 % (ref 0–8)
ERYTHROCYTE [DISTWIDTH] IN BLOOD BY AUTOMATED COUNT: 11.7 % (ref 11.5–14.5)
EST. GFR  (AFRICAN AMERICAN): >60 ML/MIN/1.73 M^2
EST. GFR  (NON AFRICAN AMERICAN): >60 ML/MIN/1.73 M^2
FERRITIN SERPL-MCNC: 83 NG/ML (ref 20–300)
GLUCOSE SERPL-MCNC: 89 MG/DL (ref 70–110)
HCT VFR BLD AUTO: 39.2 % (ref 37–48.5)
HGB BLD-MCNC: 13.2 G/DL (ref 12–16)
IMM GRANULOCYTES # BLD AUTO: 0.01 K/UL (ref 0–0.04)
IMM GRANULOCYTES NFR BLD AUTO: 0.2 % (ref 0–0.5)
IRON SERPL-MCNC: 93 UG/DL (ref 30–160)
LIPASE SERPL-CCNC: 77 U/L (ref 4–60)
LYMPHOCYTES # BLD AUTO: 1.7 K/UL (ref 1–4.8)
LYMPHOCYTES NFR BLD: 38.4 % (ref 18–48)
MAGNESIUM SERPL-MCNC: 1.8 MG/DL (ref 1.6–2.6)
MCH RBC QN AUTO: 31 PG (ref 27–31)
MCHC RBC AUTO-ENTMCNC: 33.7 G/DL (ref 32–36)
MCV RBC AUTO: 92 FL (ref 82–98)
MONOCYTES # BLD AUTO: 0.2 K/UL (ref 0.3–1)
MONOCYTES NFR BLD: 5.2 % (ref 4–15)
NEUTROPHILS # BLD AUTO: 2.4 K/UL (ref 1.8–7.7)
NEUTROPHILS NFR BLD: 54.4 % (ref 38–73)
NRBC BLD-RTO: 0 /100 WBC
PLATELET # BLD AUTO: 211 K/UL (ref 150–450)
PMV BLD AUTO: 10 FL (ref 9.2–12.9)
POTASSIUM SERPL-SCNC: 4.1 MMOL/L (ref 3.5–5.1)
PROT SERPL-MCNC: 7.4 G/DL (ref 6–8.4)
RBC # BLD AUTO: 4.26 M/UL (ref 4–5.4)
SATURATED IRON: 24 % (ref 20–50)
SODIUM SERPL-SCNC: 138 MMOL/L (ref 136–145)
T4 FREE SERPL-MCNC: 1.02 NG/DL (ref 0.71–1.51)
TOTAL IRON BINDING CAPACITY: 392 UG/DL (ref 250–450)
TRANSFERRIN SERPL-MCNC: 265 MG/DL (ref 200–375)
TSH SERPL DL<=0.005 MIU/L-ACNC: 1.47 UIU/ML (ref 0.4–4)
VIT B12 SERPL-MCNC: 445 PG/ML (ref 210–950)
WBC # BLD AUTO: 4.45 K/UL (ref 3.9–12.7)

## 2022-04-20 PROCEDURE — 82607 VITAMIN B-12: CPT | Performed by: FAMILY MEDICINE

## 2022-04-20 PROCEDURE — 84439 ASSAY OF FREE THYROXINE: CPT | Performed by: FAMILY MEDICINE

## 2022-04-20 PROCEDURE — 85025 COMPLETE CBC W/AUTO DIFF WBC: CPT | Performed by: FAMILY MEDICINE

## 2022-04-20 PROCEDURE — 84466 ASSAY OF TRANSFERRIN: CPT | Performed by: FAMILY MEDICINE

## 2022-04-20 PROCEDURE — 83690 ASSAY OF LIPASE: CPT | Performed by: FAMILY MEDICINE

## 2022-04-20 PROCEDURE — 80053 COMPREHEN METABOLIC PANEL: CPT | Performed by: FAMILY MEDICINE

## 2022-04-20 PROCEDURE — 86803 HEPATITIS C AB TEST: CPT | Performed by: FAMILY MEDICINE

## 2022-04-20 PROCEDURE — 82150 ASSAY OF AMYLASE: CPT | Performed by: FAMILY MEDICINE

## 2022-04-20 PROCEDURE — 84443 ASSAY THYROID STIM HORMONE: CPT | Performed by: FAMILY MEDICINE

## 2022-04-20 PROCEDURE — 36415 COLL VENOUS BLD VENIPUNCTURE: CPT | Performed by: FAMILY MEDICINE

## 2022-04-20 PROCEDURE — 82728 ASSAY OF FERRITIN: CPT | Performed by: FAMILY MEDICINE

## 2022-04-20 PROCEDURE — 83735 ASSAY OF MAGNESIUM: CPT | Performed by: FAMILY MEDICINE

## 2022-04-21 LAB — HCV AB SERPL QL IA: NEGATIVE

## 2022-05-04 ENCOUNTER — HOSPITAL ENCOUNTER (OUTPATIENT)
Dept: RADIOLOGY | Facility: HOSPITAL | Age: 42
Discharge: HOME | End: 2022-05-04
Attending: FAMILY MEDICINE
Payer: COMMERCIAL

## 2022-05-04 DIAGNOSIS — R14.0 BLOATED ABDOMEN: Primary | ICD-10-CM

## 2022-05-04 DIAGNOSIS — K59.00 CONSTIPATION, UNSPECIFIED CONSTIPATION TYPE: ICD-10-CM

## 2022-05-04 DIAGNOSIS — R14.0 BLOATED ABDOMEN: ICD-10-CM

## 2022-05-04 PROCEDURE — 74018 RADEX ABDOMEN 1 VIEW: CPT | Mod: 26,,, | Performed by: RADIOLOGY

## 2022-05-04 PROCEDURE — 74018 XR ABDOMEN AP 1 VIEW: ICD-10-PCS | Mod: 26,,, | Performed by: RADIOLOGY

## 2022-05-04 PROCEDURE — 74018 RADEX ABDOMEN 1 VIEW: CPT | Mod: TC

## 2022-05-13 ENCOUNTER — PATIENT MESSAGE (OUTPATIENT)
Dept: FAMILY MEDICINE | Facility: CLINIC | Age: 42
End: 2022-05-13
Payer: COMMERCIAL

## 2022-08-15 ENCOUNTER — TELEPHONE (OUTPATIENT)
Dept: FAMILY MEDICINE | Facility: CLINIC | Age: 42
End: 2022-08-15
Payer: COMMERCIAL

## 2022-08-15 NOTE — TELEPHONE ENCOUNTER
----- Message from Milad Ferris sent at 8/15/2022  8:33 AM CDT -----  Type:  Sooner Apoointment Request    Caller is requesting a sooner appointment.  Caller declined first available appointment listed below.  Caller will not accept being placed on the waitlist and is requesting a message be sent to doctor.  Name of Caller:Gogo Muro     When is the first available appointment?8/16/22    Symptoms:Eye redness, swelling    Would the patient rather a call back or a response via [facility]? Call back     Best Call Back Number:[phone] (mobile)     Additional Information:

## 2022-08-16 ENCOUNTER — OFFICE VISIT (OUTPATIENT)
Dept: FAMILY MEDICINE | Facility: CLINIC | Age: 42
End: 2022-08-16
Payer: COMMERCIAL

## 2022-08-16 VITALS
SYSTOLIC BLOOD PRESSURE: 122 MMHG | OXYGEN SATURATION: 100 % | BODY MASS INDEX: 19.36 KG/M2 | HEIGHT: 56 IN | HEART RATE: 56 BPM | TEMPERATURE: 99 F | DIASTOLIC BLOOD PRESSURE: 70 MMHG | WEIGHT: 86.06 LBS

## 2022-08-16 DIAGNOSIS — Z71.84 TRAVEL ADVICE ENCOUNTER: Primary | ICD-10-CM

## 2022-08-16 DIAGNOSIS — H10.9 CONJUNCTIVITIS OF LEFT EYE, UNSPECIFIED CONJUNCTIVITIS TYPE: ICD-10-CM

## 2022-08-16 DIAGNOSIS — K58.9 IRRITABLE BOWEL SYNDROME, UNSPECIFIED TYPE: ICD-10-CM

## 2022-08-16 PROCEDURE — 3008F BODY MASS INDEX DOCD: CPT | Mod: S$GLB,,, | Performed by: FAMILY MEDICINE

## 2022-08-16 PROCEDURE — 99999 PR PBB SHADOW E&M-EST. PATIENT-LVL III: CPT | Mod: PBBFAC,,, | Performed by: FAMILY MEDICINE

## 2022-08-16 PROCEDURE — 3074F PR MOST RECENT SYSTOLIC BLOOD PRESSURE < 130 MM HG: ICD-10-PCS | Mod: S$GLB,,, | Performed by: FAMILY MEDICINE

## 2022-08-16 PROCEDURE — 1160F PR REVIEW ALL MEDS BY PRESCRIBER/CLIN PHARMACIST DOCUMENTED: ICD-10-PCS | Mod: S$GLB,,, | Performed by: FAMILY MEDICINE

## 2022-08-16 PROCEDURE — 3078F DIAST BP <80 MM HG: CPT | Mod: S$GLB,,, | Performed by: FAMILY MEDICINE

## 2022-08-16 PROCEDURE — 1159F PR MEDICATION LIST DOCUMENTED IN MEDICAL RECORD: ICD-10-PCS | Mod: S$GLB,,, | Performed by: FAMILY MEDICINE

## 2022-08-16 PROCEDURE — 99214 PR OFFICE/OUTPT VISIT, EST, LEVL IV, 30-39 MIN: ICD-10-PCS | Mod: S$GLB,,, | Performed by: FAMILY MEDICINE

## 2022-08-16 PROCEDURE — 99214 OFFICE O/P EST MOD 30 MIN: CPT | Mod: S$GLB,,, | Performed by: FAMILY MEDICINE

## 2022-08-16 PROCEDURE — 99999 PR PBB SHADOW E&M-EST. PATIENT-LVL III: ICD-10-PCS | Mod: PBBFAC,,, | Performed by: FAMILY MEDICINE

## 2022-08-16 PROCEDURE — 3078F PR MOST RECENT DIASTOLIC BLOOD PRESSURE < 80 MM HG: ICD-10-PCS | Mod: S$GLB,,, | Performed by: FAMILY MEDICINE

## 2022-08-16 PROCEDURE — 1160F RVW MEDS BY RX/DR IN RCRD: CPT | Mod: S$GLB,,, | Performed by: FAMILY MEDICINE

## 2022-08-16 PROCEDURE — 3074F SYST BP LT 130 MM HG: CPT | Mod: S$GLB,,, | Performed by: FAMILY MEDICINE

## 2022-08-16 PROCEDURE — 1159F MED LIST DOCD IN RCRD: CPT | Mod: S$GLB,,, | Performed by: FAMILY MEDICINE

## 2022-08-16 PROCEDURE — 3008F PR BODY MASS INDEX (BMI) DOCUMENTED: ICD-10-PCS | Mod: S$GLB,,, | Performed by: FAMILY MEDICINE

## 2022-08-16 RX ORDER — NEOMYCIN SULFATE, POLYMYXIN B SULFATE, AND DEXAMETHASONE 3.5; 10000; 1 MG/G; [USP'U]/G; MG/G
OINTMENT OPHTHALMIC 3 TIMES DAILY
Qty: 3.5 G | Refills: 1 | Status: SHIPPED | OUTPATIENT
Start: 2022-08-16 | End: 2024-02-06

## 2022-08-16 NOTE — PROGRESS NOTES
Subjective:       Patient ID: Gogo Muro is a 42 y.o. female.    Chief Complaint: Eye redness,swelling    Eye pain started Saturday (3 days ago), then the next day the lower led had bump present, then the following day the eye was red and goopey with yellow discharge upon awakening.    Patient will be traveling to visit her son later this week. Concerned about anxiety while traveling. Has done well with Xanax in the past.    Depression Patient Health Questionnaire 2022   Over the last two weeks how often have you been bothered by little interest or pleasure in doing things Not at all   Over the last two weeks how often have you been bothered by feeling down, depressed or hopeless Not at all   PHQ-2 Total Score 0     No flowsheet data found.    Review of Systems   Constitutional: Negative for activity change and unexpected weight change.   HENT: Negative for hearing loss, rhinorrhea and trouble swallowing.    Eyes: Positive for discharge. Negative for visual disturbance.   Respiratory: Negative for chest tightness and wheezing.    Cardiovascular: Negative for chest pain and palpitations.   Gastrointestinal: Negative for blood in stool, constipation, diarrhea and vomiting.   Endocrine: Negative for polydipsia and polyuria.   Genitourinary: Negative for difficulty urinating, dysuria, hematuria and menstrual problem.   Musculoskeletal: Negative for arthralgias, joint swelling and neck pain.   Neurological: Negative for weakness and headaches.   Psychiatric/Behavioral: Negative for confusion and dysphoric mood.   All other systems reviewed and are negative.        Past Medical History:   Diagnosis Date    Hx of migraines     Pancreatitis     PONV (postoperative nausea and vomiting)      Past Surgical History:   Procedure Laterality Date     SECTION      x 3    HYSTERECTOMY      2016     Family History   Problem Relation Age of Onset    Hypertension Maternal Grandmother     Deep vein thrombosis  "Maternal Grandmother     Cancer Maternal Grandfather         Lung    Deep vein thrombosis Father     Stroke Brother        Review of patient's allergies indicates:   Allergen Reactions    Ibuprofen Palpitations    Keflex [cephalexin] Rash    Sulfa (sulfonamide antibiotics) Rash       Current Outpatient Medications:     butalbital-acetaminophen-caffeine -40 mg (FIORICET, ESGIC) -40 mg per tablet, Take 1 tablet by mouth every 6 (six) hours as needed., Disp: 30 tablet, Rfl: 5    ondansetron (ZOFRAN) 8 MG tablet, Take 1 tablet (8 mg total) by mouth every 6 (six) hours as needed for Nausea., Disp: 90 tablet, Rfl: 0    neomycin-polymyxin-dexamethasone (MAXITROL) 3.5 mg/g-10,000 unit/g-0.1 % Oint, Place into the left eye 3 (three) times daily., Disp: 3.5 g, Rfl: 1    tobramycin-dexamethasone 0.3-0.1% (TOBRADEX) 0.3-0.1 % Oint, Place into the left eye 2 (two) times daily., Disp: 3.5 g, Rfl: 1      Objective:      /70 (BP Location: Left arm, Patient Position: Sitting, BP Method: Medium (Manual))   Pulse (!) 56   Temp 98.7 °F (37.1 °C) (Tympanic)   Ht 4' 8" (1.422 m)   Wt 39 kg (86 lb 1.4 oz)   SpO2 100%   BMI 19.30 kg/m²   Physical Exam  Vitals and nursing note reviewed.   Constitutional:       General: She is not in acute distress.     Appearance: Normal appearance. She is well-developed and normal weight. She is not ill-appearing, toxic-appearing or diaphoretic.   HENT:      Head: Normocephalic and atraumatic.      Right Ear: Tympanic membrane, ear canal and external ear normal. There is no impacted cerumen.      Left Ear: Tympanic membrane, ear canal and external ear normal. There is no impacted cerumen.      Nose: Nose normal. No congestion.      Mouth/Throat:      Mouth: Mucous membranes are moist.      Pharynx: Oropharynx is clear. No oropharyngeal exudate.   Eyes:      General: No scleral icterus.        Right eye: No discharge.         Left eye: Discharge (L lower eyelid with mild erythema, " injected conjunctiva) present.     Extraocular Movements: Extraocular movements intact.      Pupils: Pupils are equal, round, and reactive to light.   Neck:      Thyroid: No thyromegaly.      Vascular: No JVD.      Trachea: No tracheal deviation.   Cardiovascular:      Rate and Rhythm: Normal rate and regular rhythm.      Pulses: Normal pulses.      Heart sounds: Normal heart sounds. No murmur heard.  Pulmonary:      Effort: Pulmonary effort is normal. No respiratory distress.      Breath sounds: Normal breath sounds. No wheezing.   Abdominal:      General: Abdomen is flat. There is no distension.   Musculoskeletal:      Cervical back: Normal range of motion and neck supple. No rigidity or tenderness.      Right lower leg: No edema.      Left lower leg: No edema.   Lymphadenopathy:      Cervical: No cervical adenopathy.   Skin:     General: Skin is warm and dry.      Capillary Refill: Capillary refill takes less than 2 seconds.      Coloration: Skin is not pale.      Findings: No erythema or rash.   Neurological:      General: No focal deficit present.      Mental Status: She is alert and oriented to person, place, and time. Mental status is at baseline.   Psychiatric:         Mood and Affect: Mood normal.         Behavior: Behavior normal.         Thought Content: Thought content normal.         Judgment: Judgment normal.       Assessment:       1. Travel advice encounter    2. Conjunctivitis of left eye, unspecified conjunctivitis type    3. Irritable bowel syndrome, unspecified type        Plan:       Travel advice encounter    Conjunctivitis of left eye, unspecified conjunctivitis type    Irritable bowel syndrome, unspecified type    Other orders  -     tobramycin-dexamethasone 0.3-0.1% (TOBRADEX) 0.3-0.1 % Oint; Place into the left eye 2 (two) times daily.  Dispense: 3.5 g; Refill: 1  -     neomycin-polymyxin-dexamethasone (MAXITROL) 3.5 mg/g-10,000 unit/g-0.1 % Oint; Place into the left eye 3 (three) times  daily.  Dispense: 3.5 g; Refill: 1    Tobradex is my first choice, but could be $$$. Maxitrol oitment sent in as an alternative.    Discussed recommendations for travel. Xanax prescription sent in different encounter.        Previous records in Epic were reviewed, including the last 3 months of encounters, imaging, laboratory, and pathology reports.    Strict return precautions reviewed and patient verbalized understanding. Risks, benefits, and alternatives to the plan were reviewed in detail and all questions answered to the patient's satisfaction. Patient agrees to return to clinic or ER if symptoms worsen. 30 minutes total were spent on today's visit, not limited to but including time based on counseling and coordination of care.    Patient instructed that best way to communicate with my office staff is for patient to get on the [facility] patient portal to expedite communication and communication issues that may occur.  Patient was given instructions on how to get on the portal.  I encouraged patient to obtain portal access as well.  Ultimately it is up to the patient to obtain access.  Patient voiced understanding.    This note was created using [employer] voice recognition software that occasionally may misinterpret phrases or words.    Follow up in about 2 months (around 10/16/2022) for follow up stomach.

## 2022-08-17 ENCOUNTER — TELEPHONE (OUTPATIENT)
Dept: FAMILY MEDICINE | Facility: CLINIC | Age: 42
End: 2022-08-17
Payer: COMMERCIAL

## 2022-08-17 RX ORDER — ALPRAZOLAM 0.5 MG/1
0.5 TABLET ORAL 3 TIMES DAILY PRN
Qty: 30 TABLET | Refills: 0 | Status: SHIPPED | OUTPATIENT
Start: 2022-08-17 | End: 2023-02-10 | Stop reason: SDUPTHER

## 2022-08-17 RX ORDER — HYOSCYAMINE SULFATE 0.12 MG/1
0.12 TABLET SUBLINGUAL EVERY 4 HOURS PRN
Qty: 30 TABLET | Refills: 1 | Status: SHIPPED | OUTPATIENT
Start: 2022-08-17 | End: 2024-02-06

## 2022-08-17 RX ORDER — ONDANSETRON 4 MG/1
4 TABLET, ORALLY DISINTEGRATING ORAL EVERY 6 HOURS PRN
Qty: 30 TABLET | Refills: 1 | Status: SHIPPED | OUTPATIENT
Start: 2022-08-17 | End: 2023-04-17

## 2022-08-17 NOTE — TELEPHONE ENCOUNTER
Spoke with patient advised patient that all her meds was sent in and if she needed anything else to let us know.

## 2022-08-17 NOTE — TELEPHONE ENCOUNTER
Please notify pt I have sent all of the meds in--some of them did not go through yesterday. My apologies!

## 2022-08-17 NOTE — TELEPHONE ENCOUNTER
----- Message from Sari cOampo sent at 8/17/2022  7:47 AM CDT -----  Regarding: Medicaition Fill  Patient states received 1 of 3 new prescriptions to be filled. Please call [phone].

## 2022-09-28 ENCOUNTER — TELEPHONE (OUTPATIENT)
Dept: OBSTETRICS AND GYNECOLOGY | Facility: CLINIC | Age: 42
End: 2022-09-28
Payer: COMMERCIAL

## 2022-09-28 NOTE — TELEPHONE ENCOUNTER
----- Message from Ricardo Gomez LPN sent at 9/28/2022  3:21 PM CDT -----  Contact: pt    ----- Message -----  From: Mary Beth Del Castillo  Sent: 9/28/2022  11:35 AM CDT  To: Ricardo Gomez LPN, [facility] Staff    Type:  RX Refill Request-- needs today    Who Called:  pt  Refill or New Rx:  refill  RX Name and Strength:  butalbital-acetaminophen-caffeine -40 mg (FIORICET, ESGIC) -40 mg per tablet 30 tablet 5 1/26/2022  No  Sig - Route: Take 1 tablet by mouth every 6 (six) hours as needed. - Oral      How is the patient currently taking it? (ex. 1XDay):  as order  Is this a 30 day or 90 day RX:  as order  Preferred Pharmacy with phone number:        [id number] - [place], [place] - [address] [address] [address] [address] [id number] [address]  Phone: [phone] Fax: [phone]      Local or Mail Order:  local  Ordering Provider:  kelli roland Call Back Number:  [phone]  Additional Information:

## 2022-10-05 ENCOUNTER — OFFICE VISIT (OUTPATIENT)
Dept: FAMILY MEDICINE | Facility: CLINIC | Age: 42
End: 2022-10-05
Payer: COMMERCIAL

## 2022-10-05 VITALS
HEIGHT: 56 IN | TEMPERATURE: 98 F | DIASTOLIC BLOOD PRESSURE: 62 MMHG | OXYGEN SATURATION: 100 % | BODY MASS INDEX: 19.36 KG/M2 | HEART RATE: 69 BPM | SYSTOLIC BLOOD PRESSURE: 100 MMHG | WEIGHT: 86.06 LBS

## 2022-10-05 DIAGNOSIS — F41.8 SITUATIONAL ANXIETY: ICD-10-CM

## 2022-10-05 DIAGNOSIS — E53.8 LOW SERUM VITAMIN B12: ICD-10-CM

## 2022-10-05 DIAGNOSIS — K58.9 IRRITABLE BOWEL SYNDROME, UNSPECIFIED TYPE: ICD-10-CM

## 2022-10-05 PROCEDURE — 1159F MED LIST DOCD IN RCRD: CPT | Mod: S$GLB,,, | Performed by: FAMILY MEDICINE

## 2022-10-05 PROCEDURE — 3078F PR MOST RECENT DIASTOLIC BLOOD PRESSURE < 80 MM HG: ICD-10-PCS | Mod: S$GLB,,, | Performed by: FAMILY MEDICINE

## 2022-10-05 PROCEDURE — 3074F SYST BP LT 130 MM HG: CPT | Mod: S$GLB,,, | Performed by: FAMILY MEDICINE

## 2022-10-05 PROCEDURE — 1160F PR REVIEW ALL MEDS BY PRESCRIBER/CLIN PHARMACIST DOCUMENTED: ICD-10-PCS | Mod: S$GLB,,, | Performed by: FAMILY MEDICINE

## 2022-10-05 PROCEDURE — 99214 OFFICE O/P EST MOD 30 MIN: CPT | Mod: S$GLB,,, | Performed by: FAMILY MEDICINE

## 2022-10-05 PROCEDURE — 99214 PR OFFICE/OUTPT VISIT, EST, LEVL IV, 30-39 MIN: ICD-10-PCS | Mod: S$GLB,,, | Performed by: FAMILY MEDICINE

## 2022-10-05 PROCEDURE — 99999 PR PBB SHADOW E&M-EST. PATIENT-LVL IV: ICD-10-PCS | Mod: PBBFAC,,, | Performed by: FAMILY MEDICINE

## 2022-10-05 PROCEDURE — 3074F PR MOST RECENT SYSTOLIC BLOOD PRESSURE < 130 MM HG: ICD-10-PCS | Mod: S$GLB,,, | Performed by: FAMILY MEDICINE

## 2022-10-05 PROCEDURE — 1159F PR MEDICATION LIST DOCUMENTED IN MEDICAL RECORD: ICD-10-PCS | Mod: S$GLB,,, | Performed by: FAMILY MEDICINE

## 2022-10-05 PROCEDURE — 3078F DIAST BP <80 MM HG: CPT | Mod: S$GLB,,, | Performed by: FAMILY MEDICINE

## 2022-10-05 PROCEDURE — 99999 PR PBB SHADOW E&M-EST. PATIENT-LVL IV: CPT | Mod: PBBFAC,,, | Performed by: FAMILY MEDICINE

## 2022-10-05 PROCEDURE — 1160F RVW MEDS BY RX/DR IN RCRD: CPT | Mod: S$GLB,,, | Performed by: FAMILY MEDICINE

## 2022-10-05 NOTE — PROGRESS NOTES
Subjective:       Patient ID: Gogo Muro is a 42 y.o. female.    Chief Complaint: Follow-up    Here today after travel encounter. States she prepped the night before with half tablet of Xanax, then took another half tablet the morning of the flight, and she was truly able to enjoy her trip.    Saw Dr. Pryor's NP for L eye lesion. States if not improved she will have the lesion incised. Possibly a meibomian gland cyst.    IBS is at baseline, she takes OTC Miralax when she gets constipated but if she takes too much then diarrhea develops.    Depression Patient Health Questionnaire 2022   Over the last two weeks how often have you been bothered by little interest or pleasure in doing things Not at all   Over the last two weeks how often have you been bothered by feeling down, depressed or hopeless Not at all   PHQ-2 Total Score 0     Review of Systems   All other systems reviewed and are negative.      Past Medical History:   Diagnosis Date    Hx of migraines     Pancreatitis     PONV (postoperative nausea and vomiting)      Past Surgical History:   Procedure Laterality Date     SECTION      x 3    EYE SURGERY      HYSTERECTOMY      2016     Family History   Problem Relation Age of Onset    Hypertension Maternal Grandmother     Deep vein thrombosis Maternal Grandmother     Cancer Maternal Grandfather         Lung    Deep vein thrombosis Father     Stroke Brother     Hypertension Mother        Review of patient's allergies indicates:   Allergen Reactions    Ibuprofen Palpitations    Keflex [cephalexin] Rash    Sulfa (sulfonamide antibiotics) Rash       Current Outpatient Medications:     ALPRAZolam (XANAX) 0.5 MG tablet, Take 1 tablet (0.5 mg total) by mouth 3 (three) times daily as needed for Anxiety., Disp: 30 tablet, Rfl: 0    butalbital-acetaminophen-caffeine -40 mg (FIORICET, ESGIC) -40 mg per tablet, Take 1 tablet by mouth every 6 (six) hours as needed., Disp: 30 tablet, Rfl:  "1    hyoscyamine (LEVSIN/SL) 0.125 mg Subl, Place 1 tablet (0.125 mg total) under the tongue every 4 (four) hours as needed (nausea or stomach cramping)., Disp: 30 tablet, Rfl: 1    neomycin-polymyxin-dexamethasone (MAXITROL) 3.5 mg/g-10,000 unit/g-0.1 % Oint, Place into the left eye 3 (three) times daily., Disp: 3.5 g, Rfl: 1    ondansetron (ZOFRAN) 8 MG tablet, Take 1 tablet (8 mg total) by mouth every 6 (six) hours as needed for Nausea., Disp: 90 tablet, Rfl: 0    ondansetron (ZOFRAN-ODT) 4 MG TbDL, Take 1 tablet (4 mg total) by mouth every 6 (six) hours as needed (nausea)., Disp: 30 tablet, Rfl: 1    tobramycin-dexamethasone 0.3-0.1% (TOBRADEX) 0.3-0.1 % Oint, Place into the left eye 2 (two) times daily., Disp: 3.5 g, Rfl: 1      Objective:      /62 (BP Location: Left arm, Patient Position: Sitting, BP Method: Medium (Manual))   Pulse 69   Temp 97.6 °F (36.4 °C) (Tympanic)   Ht 4' 8" (1.422 m)   Wt 39 kg (86 lb 1.4 oz)   SpO2 100%   BMI 19.30 kg/m²   Physical Exam  Vitals and nursing note reviewed.   Constitutional:       General: She is not in acute distress.     Appearance: Normal appearance. She is well-developed and normal weight. She is not ill-appearing, toxic-appearing or diaphoretic.   HENT:      Head: Normocephalic and atraumatic.      Right Ear: External ear normal.      Left Ear: External ear normal.   Eyes:      General: No scleral icterus.        Right eye: No discharge.         Left eye: No discharge.      Extraocular Movements: Extraocular movements intact.      Conjunctiva/sclera: Conjunctivae normal.   Neck:      Thyroid: No thyromegaly.      Vascular: No JVD.      Trachea: No tracheal deviation.   Cardiovascular:      Rate and Rhythm: Normal rate and regular rhythm.      Pulses: Normal pulses.      Heart sounds: Normal heart sounds. No murmur heard.    No friction rub. No gallop.   Pulmonary:      Effort: Pulmonary effort is normal. No respiratory distress.      Breath sounds: " Normal breath sounds. No wheezing, rhonchi or rales.   Abdominal:      General: Abdomen is flat. There is no distension.      Palpations: Abdomen is soft.   Musculoskeletal:         General: Normal range of motion.      Cervical back: Normal range of motion and neck supple. No tenderness.      Right lower leg: No edema.      Left lower leg: No edema.   Skin:     General: Skin is warm and dry.      Capillary Refill: Capillary refill takes less than 2 seconds.      Coloration: Skin is not jaundiced or pale.      Findings: No erythema or rash.   Neurological:      General: No focal deficit present.      Mental Status: She is alert and oriented to person, place, and time. Mental status is at baseline.      Motor: No weakness.      Coordination: Coordination normal.      Gait: Gait normal.   Psychiatric:         Mood and Affect: Mood normal.         Behavior: Behavior normal.         Thought Content: Thought content normal.         Judgment: Judgment normal.       Assessment:       1. Pancreatitis, recurrent    2. Irritable bowel syndrome, unspecified type    3. Situational anxiety    4. Low serum vitamin B12        Plan:       Pancreatitis, recurrent  -     Amylase; Future; Expected date: 10/05/2022  -     Lipase; Future; Expected date: 10/05/2022    Irritable bowel syndrome, unspecified type    Situational anxiety    Low serum vitamin B12    Repeat pancreatic enzymes.  Continue IBS plan of care, using Miralax prn.  Start OTC [employer]s methyl B12 liquid spray.  Keep follow up with eye specialist.        Previous records in Epic were reviewed, including the last 3 months of encounters, imaging, laboratory, and pathology reports.    Strict return precautions reviewed and patient verbalized understanding. Risks, benefits, and alternatives to the plan were reviewed in detail and all questions answered to the patient's satisfaction. Patient agrees to return to clinic or ER if symptoms worsen. 30 minutes total were  spent on today's visit, not limited to but including time based on counseling and coordination of care.    Patient instructed that best way to communicate with my office staff is for patient to get on the [facility] epic patient portal to expedite communication and communication issues that may occur.  Patient was given instructions on how to get on the portal.  I encouraged patient to obtain portal access as well.  Ultimately it is up to the patient to obtain access.  Patient voiced understanding.    This note was created using [employer] voice recognition software that occasionally may misinterpret phrases or words.    Follow up in about 3 months (around 1/5/2023) for routine follow up, repeat pancreatic enzymes.

## 2022-10-06 ENCOUNTER — PATIENT MESSAGE (OUTPATIENT)
Dept: FAMILY MEDICINE | Facility: CLINIC | Age: 42
End: 2022-10-06
Payer: COMMERCIAL

## 2022-10-07 ENCOUNTER — LAB VISIT (OUTPATIENT)
Dept: LAB | Facility: CLINIC | Age: 42
End: 2022-10-07
Payer: COMMERCIAL

## 2022-10-07 DIAGNOSIS — G43.109 MIGRAINE WITH AURA AND WITHOUT STATUS MIGRAINOSUS, NOT INTRACTABLE: ICD-10-CM

## 2022-10-07 DIAGNOSIS — N95.1 MENOPAUSAL SYMPTOMS: ICD-10-CM

## 2022-10-07 LAB
AMYLASE SERPL-CCNC: 186 U/L (ref 20–110)
HCV AB SERPL QL IA: NORMAL
LIPASE SERPL-CCNC: 85 U/L (ref 4–60)

## 2022-10-07 PROCEDURE — 83690 ASSAY OF LIPASE: CPT | Performed by: FAMILY MEDICINE

## 2022-10-07 PROCEDURE — 82150 ASSAY OF AMYLASE: CPT | Performed by: FAMILY MEDICINE

## 2022-10-07 PROCEDURE — 86803 HEPATITIS C AB TEST: CPT | Performed by: FAMILY MEDICINE

## 2022-10-07 PROCEDURE — 36415 COLL VENOUS BLD VENIPUNCTURE: CPT | Mod: ,,, | Performed by: STUDENT IN AN ORGANIZED HEALTH CARE EDUCATION/TRAINING PROGRAM

## 2022-10-07 PROCEDURE — 36415 PR COLLECTION VENOUS BLOOD,VENIPUNCTURE: ICD-10-PCS | Mod: ,,, | Performed by: STUDENT IN AN ORGANIZED HEALTH CARE EDUCATION/TRAINING PROGRAM

## 2022-10-16 PROBLEM — F41.8 SITUATIONAL ANXIETY: Status: ACTIVE | Noted: 2022-10-16

## 2022-10-16 PROBLEM — E53.8 LOW SERUM VITAMIN B12: Status: ACTIVE | Noted: 2022-10-16

## 2022-10-16 PROBLEM — K58.9 IRRITABLE BOWEL SYNDROME: Status: ACTIVE | Noted: 2022-10-16

## 2022-12-16 DIAGNOSIS — Z12.31 ENCOUNTER FOR SCREENING MAMMOGRAM FOR BREAST CANCER: Primary | ICD-10-CM

## 2022-12-23 ENCOUNTER — TELEPHONE (OUTPATIENT)
Dept: OBSTETRICS AND GYNECOLOGY | Facility: CLINIC | Age: 42
End: 2022-12-23
Payer: COMMERCIAL

## 2022-12-23 NOTE — TELEPHONE ENCOUNTER
----- Message from Ricardo Gomez LPN sent at 12/16/2022 12:38 PM CST -----    ----- Message -----  From: Katelin Romo  Sent: 12/16/2022   8:39 AM CST  To: [facility] Staff    Type:  RX Refill Request    Who Called:   pt  Refill or New Rx:  refill  RX Name and Strength:   butalbital-acetaminophen-caffeine -40 mg (FIORICET, ESGIC) -40 mg per tablet  How is the patient currently taking it? (ex. 1XDay):  as ordered  Is this a 30 day or 90 day RX:  90  Preferred Pharmacy with phone number:      [id number] - [place], [place] - [address] [address] [address] [address] [facility]   [address] [address] [address]  Phone: [phone] Fax: [phone]    Local or Mail Order:  local  Ordering Provider:  aroldo Can Call Back Number:  [phone]  Additional Information:

## 2023-01-11 ENCOUNTER — OFFICE VISIT (OUTPATIENT)
Dept: FAMILY MEDICINE | Facility: CLINIC | Age: 43
End: 2023-01-11
Payer: COMMERCIAL

## 2023-01-11 VITALS
BODY MASS INDEX: 19.64 KG/M2 | WEIGHT: 87.31 LBS | TEMPERATURE: 98 F | HEIGHT: 56 IN | OXYGEN SATURATION: 100 % | DIASTOLIC BLOOD PRESSURE: 62 MMHG | SYSTOLIC BLOOD PRESSURE: 98 MMHG | HEART RATE: 90 BPM

## 2023-01-11 DIAGNOSIS — K58.1 IRRITABLE BOWEL SYNDROME WITH CONSTIPATION: ICD-10-CM

## 2023-01-11 DIAGNOSIS — G43.109 MIGRAINE WITH AURA AND WITHOUT STATUS MIGRAINOSUS, NOT INTRACTABLE: Primary | ICD-10-CM

## 2023-01-11 DIAGNOSIS — F41.8 SITUATIONAL ANXIETY: ICD-10-CM

## 2023-01-11 DIAGNOSIS — E53.8 LOW SERUM VITAMIN B12: ICD-10-CM

## 2023-01-11 PROCEDURE — 99215 PR OFFICE/OUTPT VISIT, EST, LEVL V, 40-54 MIN: ICD-10-PCS | Mod: S$GLB,,, | Performed by: FAMILY MEDICINE

## 2023-01-11 PROCEDURE — 99999 PR PBB SHADOW E&M-EST. PATIENT-LVL IV: CPT | Mod: PBBFAC,,, | Performed by: FAMILY MEDICINE

## 2023-01-11 PROCEDURE — 1159F MED LIST DOCD IN RCRD: CPT | Mod: S$GLB,,, | Performed by: FAMILY MEDICINE

## 2023-01-11 PROCEDURE — 3078F PR MOST RECENT DIASTOLIC BLOOD PRESSURE < 80 MM HG: ICD-10-PCS | Mod: S$GLB,,, | Performed by: FAMILY MEDICINE

## 2023-01-11 PROCEDURE — 1160F RVW MEDS BY RX/DR IN RCRD: CPT | Mod: S$GLB,,, | Performed by: FAMILY MEDICINE

## 2023-01-11 PROCEDURE — 3074F SYST BP LT 130 MM HG: CPT | Mod: S$GLB,,, | Performed by: FAMILY MEDICINE

## 2023-01-11 PROCEDURE — 1159F PR MEDICATION LIST DOCUMENTED IN MEDICAL RECORD: ICD-10-PCS | Mod: S$GLB,,, | Performed by: FAMILY MEDICINE

## 2023-01-11 PROCEDURE — 99999 PR PBB SHADOW E&M-EST. PATIENT-LVL IV: ICD-10-PCS | Mod: PBBFAC,,, | Performed by: FAMILY MEDICINE

## 2023-01-11 PROCEDURE — 3008F BODY MASS INDEX DOCD: CPT | Mod: S$GLB,,, | Performed by: FAMILY MEDICINE

## 2023-01-11 PROCEDURE — 3074F PR MOST RECENT SYSTOLIC BLOOD PRESSURE < 130 MM HG: ICD-10-PCS | Mod: S$GLB,,, | Performed by: FAMILY MEDICINE

## 2023-01-11 PROCEDURE — 3078F DIAST BP <80 MM HG: CPT | Mod: S$GLB,,, | Performed by: FAMILY MEDICINE

## 2023-01-11 PROCEDURE — 99215 OFFICE O/P EST HI 40 MIN: CPT | Mod: S$GLB,,, | Performed by: FAMILY MEDICINE

## 2023-01-11 PROCEDURE — 3008F PR BODY MASS INDEX (BMI) DOCUMENTED: ICD-10-PCS | Mod: S$GLB,,, | Performed by: FAMILY MEDICINE

## 2023-01-11 PROCEDURE — 1160F PR REVIEW ALL MEDS BY PRESCRIBER/CLIN PHARMACIST DOCUMENTED: ICD-10-PCS | Mod: S$GLB,,, | Performed by: FAMILY MEDICINE

## 2023-01-11 RX ORDER — RIMEGEPANT SULFATE 75 MG/75MG
75 TABLET, ORALLY DISINTEGRATING ORAL ONCE AS NEEDED
Qty: 8 TABLET | Refills: 2 | Status: SHIPPED | OUTPATIENT
Start: 2023-01-11 | End: 2023-01-11

## 2023-01-11 RX ORDER — ONDANSETRON 4 MG/1
4 TABLET, ORALLY DISINTEGRATING ORAL EVERY 6 HOURS PRN
Qty: 90 TABLET | Refills: 1 | Status: SHIPPED | OUTPATIENT
Start: 2023-01-11 | End: 2023-12-17

## 2023-01-11 NOTE — PROGRESS NOTES
"  Subjective:       Patient ID: Gogo Muro is a 42 y.o. female.    Chief Complaint: Headache    43yo WF here today ( present), complains of recent headache that lasted for days--a week.    Patient notes that when she gets a "terrible hot flash", hours later she develops a really bad migraine. States she was experiencing "an out of the blue hot flash" and then had migraine that lasted for many days.    In December 2022, she states her hormonal symptoms more severe than usual. She also had prodrome and aura (vision changes in the L eye); typically the Tylenol or Fioricet helps but she was down for a couple of days even with medication.    Cannot tolerate NSAIDs due to IBU allergy. Has never taken ASA either due to this. Tried Excedrin tension and Tylenol in the past and has helped but not always. Has not tried triptans.    Has never taken a migraine preventive. She saw neuro at [facility] a couple of years ago and tried cgrp injectables, but that led to severe constipation.     Patient notes that she has a lot of "gut issues"     Depression Patient Health Questionnaire 1/11/2023 4/11/2022   Over the last two weeks how often have you been bothered by little interest or pleasure in doing things Not at all Not at all   Over the last two weeks how often have you been bothered by feeling down, depressed or hopeless Not at all Not at all   PHQ-2 Total Score 0 0     No flowsheet data found.    Review of Systems   Constitutional:  Negative for activity change and unexpected weight change.   HENT:  Negative for hearing loss, rhinorrhea and trouble swallowing.    Eyes:  Negative for discharge and visual disturbance.   Respiratory:  Negative for chest tightness and wheezing.    Cardiovascular:  Negative for chest pain and palpitations.   Gastrointestinal:  Negative for blood in stool, constipation, diarrhea and vomiting.   Endocrine: Negative for polydipsia and polyuria.   Genitourinary:  Negative for difficulty " urinating, dysuria, hematuria and menstrual problem.   Musculoskeletal:  Negative for arthralgias, joint swelling and neck pain.   Neurological:  Positive for headaches. Negative for weakness.   Psychiatric/Behavioral:  Negative for confusion and dysphoric mood.        Past Medical History:   Diagnosis Date    Hx of migraines     Pancreatitis     PONV (postoperative nausea and vomiting)      Past Surgical History:   Procedure Laterality Date     SECTION      x 3    EYE SURGERY      HYSTERECTOMY      2016     Family History   Problem Relation Age of Onset    Hypertension Maternal Grandmother     Deep vein thrombosis Maternal Grandmother     Cancer Maternal Grandfather         Lung    Deep vein thrombosis Father     Stroke Brother     Hypertension Mother        Review of patient's allergies indicates:   Allergen Reactions    Ibuprofen Palpitations    Keflex [cephalexin] Rash    Sulfa (sulfonamide antibiotics) Rash       Current Outpatient Medications:     ALPRAZolam (XANAX) 0.5 MG tablet, Take 1 tablet (0.5 mg total) by mouth 3 (three) times daily as needed for Anxiety., Disp: 30 tablet, Rfl: 0    butalbital-acetaminophen-caffeine -40 mg (FIORICET, ESGIC) -40 mg per tablet, Take 1 tablet by mouth every 6 (six) hours as needed., Disp: 30 tablet, Rfl: 1    hyoscyamine (LEVSIN/SL) 0.125 mg Subl, Place 1 tablet (0.125 mg total) under the tongue every 4 (four) hours as needed (nausea or stomach cramping)., Disp: 30 tablet, Rfl: 1    neomycin-polymyxin-dexamethasone (MAXITROL) 3.5 mg/g-10,000 unit/g-0.1 % Oint, Place into the left eye 3 (three) times daily., Disp: 3.5 g, Rfl: 1    ondansetron (ZOFRAN) 8 MG tablet, Take 1 tablet (8 mg total) by mouth every 6 (six) hours as needed for Nausea., Disp: 90 tablet, Rfl: 0    ondansetron (ZOFRAN-ODT) 4 MG TbDL, Take 1 tablet (4 mg total) by mouth every 6 (six) hours as needed (nausea)., Disp: 30 tablet, Rfl: 1    ondansetron (ZOFRAN-ODT) 4 MG TbDL, Take 1 tablet  "(4 mg total) by mouth every 6 (six) hours as needed (nausea or vomiting)., Disp: 90 tablet, Rfl: 1      Objective:      BP 98/62 (BP Location: Left arm, Patient Position: Sitting, BP Method: Medium (Manual))   Pulse 90   Temp 98.2 °F (36.8 °C) (Tympanic)   Ht 4' 8" (1.422 m)   Wt 39.6 kg (87 lb 4.8 oz)   SpO2 100%   BMI 19.57 kg/m²   Physical Exam  Vitals and nursing note reviewed.   Constitutional:       General: She is not in acute distress.     Appearance: Normal appearance. She is well-developed and normal weight. She is not ill-appearing, toxic-appearing or diaphoretic.   HENT:      Head: Normocephalic and atraumatic.      Right Ear: Tympanic membrane, ear canal and external ear normal. There is no impacted cerumen.      Left Ear: Tympanic membrane and external ear normal. There is no impacted cerumen.      Mouth/Throat:      Pharynx: No oropharyngeal exudate.   Eyes:      General: No scleral icterus.        Right eye: No discharge.         Left eye: No discharge.      Extraocular Movements: Extraocular movements intact.      Conjunctiva/sclera: Conjunctivae normal.      Pupils: Pupils are equal, round, and reactive to light.   Neck:      Thyroid: No thyromegaly.      Vascular: No JVD.      Trachea: No tracheal deviation.   Cardiovascular:      Rate and Rhythm: Normal rate and regular rhythm.      Pulses: Normal pulses.      Heart sounds: Normal heart sounds. No murmur heard.    No friction rub. No gallop.   Pulmonary:      Effort: Pulmonary effort is normal. No respiratory distress.      Breath sounds: Normal breath sounds. No wheezing, rhonchi or rales.   Abdominal:      General: Abdomen is flat. There is no distension.   Musculoskeletal:         General: Normal range of motion.      Cervical back: Normal range of motion and neck supple. No tenderness.      Right lower leg: No edema.      Left lower leg: No edema.   Lymphadenopathy:      Cervical: No cervical adenopathy.   Skin:     General: Skin is " warm and dry.      Capillary Refill: Capillary refill takes less than 2 seconds.      Coloration: Skin is not jaundiced or pale.      Findings: No erythema or rash.   Neurological:      General: No focal deficit present.      Mental Status: She is alert and oriented to person, place, and time. Mental status is at baseline.      Cranial Nerves: No cranial nerve deficit.      Motor: No weakness.      Coordination: Coordination normal.      Gait: Gait normal.   Psychiatric:         Mood and Affect: Mood normal.         Behavior: Behavior normal.         Thought Content: Thought content normal.         Judgment: Judgment normal.       Assessment:       1. Migraine with aura and without status migrainosus, not intractable    2. Situational anxiety    3. Irritable bowel syndrome with constipation    4. Low serum vitamin B12    5. Pancreatitis, recurrent        Plan:       Migraine with aura and without status migrainosus, not intractable  -     rimegepant (NURTEC) 75 mg odt; Take 1 tablet (75 mg total) by mouth once as needed for Migraine. Place ODT tablet on the tongue; alternatively the ODT tablet may be placed under the tongue  Dispense: 8 tablet; Refill: 2  -     ondansetron (ZOFRAN-ODT) 4 MG TbDL; Take 1 tablet (4 mg total) by mouth every 6 (six) hours as needed (nausea or vomiting).  Dispense: 90 tablet; Refill: 1    Situational anxiety    Irritable bowel syndrome with constipation    Low serum vitamin B12    Pancreatitis, recurrent            Previous records in Epic were reviewed, including the last 3 months of encounters, imaging, laboratory, and pathology reports.    Strict return precautions reviewed and patient verbalized understanding. Risks, benefits, and alternatives to the plan were reviewed in detail and all questions answered to the patient's satisfaction. Patient agrees to return to clinic or ER if symptoms worsen. 40 minutes total were spent on today's visit, not limited to but including time based on  counseling and coordination of care.    Patient instructed that best way to communicate with my office staff is for patient to get on the [facility] epic patient portal to expedite communication and communication issues that may occur.  Patient was given instructions on how to get on the portal.  I encouraged patient to obtain portal access as well.  Ultimately it is up to the patient to obtain access.  Patient voiced understanding.    This note was created using [employer] voice recognition software that occasionally may misinterpret phrases or words.    Follow up in about 4 weeks (around 2/8/2023) for migraine.

## 2023-01-13 ENCOUNTER — PATIENT MESSAGE (OUTPATIENT)
Dept: FAMILY MEDICINE | Facility: CLINIC | Age: 43
End: 2023-01-13
Payer: COMMERCIAL

## 2023-01-22 PROBLEM — G43.109 MIGRAINE WITH AURA AND WITHOUT STATUS MIGRAINOSUS, NOT INTRACTABLE: Status: ACTIVE | Noted: 2023-01-22

## 2023-01-27 ENCOUNTER — OFFICE VISIT (OUTPATIENT)
Dept: OBSTETRICS AND GYNECOLOGY | Facility: CLINIC | Age: 43
End: 2023-01-27
Payer: COMMERCIAL

## 2023-01-27 VITALS
SYSTOLIC BLOOD PRESSURE: 119 MMHG | WEIGHT: 88.81 LBS | BODY MASS INDEX: 19.98 KG/M2 | DIASTOLIC BLOOD PRESSURE: 59 MMHG | HEIGHT: 56 IN

## 2023-01-27 DIAGNOSIS — G43.C0 PERIODIC HEADACHE SYNDROME, NOT INTRACTABLE: Primary | ICD-10-CM

## 2023-01-27 DIAGNOSIS — Z01.419 ENCOUNTER FOR WELL WOMAN EXAM WITH ROUTINE GYNECOLOGICAL EXAM: ICD-10-CM

## 2023-01-27 DIAGNOSIS — Z86.69 HX OF MIGRAINE HEADACHES: ICD-10-CM

## 2023-01-27 DIAGNOSIS — Z12.31 BREAST CANCER SCREENING BY MAMMOGRAM: ICD-10-CM

## 2023-01-27 PROBLEM — H52.209 ASTIGMATISM: Status: ACTIVE | Noted: 2023-01-27

## 2023-01-27 PROBLEM — Z87.442 HISTORY OF RENAL CALCULI: Status: ACTIVE | Noted: 2023-01-27

## 2023-01-27 PROBLEM — L71.9 ROSACEA: Status: ACTIVE | Noted: 2023-01-27

## 2023-01-27 PROBLEM — K20.90 ESOPHAGITIS: Status: ACTIVE | Noted: 2023-01-27

## 2023-01-27 PROBLEM — H81.10 BENIGN PAROXYSMAL POSITIONAL VERTIGO: Status: ACTIVE | Noted: 2023-01-27

## 2023-01-27 PROBLEM — R53.83 MALAISE AND FATIGUE: Status: ACTIVE | Noted: 2023-01-27

## 2023-01-27 PROBLEM — M54.9 COSTOVERTEBRAL ANGLE TENDERNESS: Status: ACTIVE | Noted: 2023-01-27

## 2023-01-27 PROBLEM — F43.0 ACUTE SITUATIONAL DISTURBANCE: Status: ACTIVE | Noted: 2023-01-27

## 2023-01-27 PROBLEM — G44.89 HEADACHE SYNDROME: Status: ACTIVE | Noted: 2023-01-27

## 2023-01-27 PROBLEM — G43.909 MIGRAINE HEADACHE: Status: ACTIVE | Noted: 2023-01-27

## 2023-01-27 PROBLEM — M25.519 ARTHRALGIA OF SHOULDER: Status: ACTIVE | Noted: 2023-01-27

## 2023-01-27 PROBLEM — R53.81 MALAISE AND FATIGUE: Status: ACTIVE | Noted: 2023-01-27

## 2023-01-27 PROBLEM — I65.23 BILATERAL CAROTID ARTERY STENOSIS: Status: ACTIVE | Noted: 2023-01-27

## 2023-01-27 PROBLEM — G43.109 MIGRAINE AURA WITHOUT HEADACHE: Status: ACTIVE | Noted: 2023-01-27

## 2023-01-27 PROBLEM — R91.8 LUNG MASS: Status: ACTIVE | Noted: 2023-01-27

## 2023-01-27 PROBLEM — H04.129 DRY EYE SYNDROME: Status: ACTIVE | Noted: 2023-01-27

## 2023-01-27 PROCEDURE — 1159F MED LIST DOCD IN RCRD: CPT | Mod: S$GLB,,, | Performed by: OBSTETRICS & GYNECOLOGY

## 2023-01-27 PROCEDURE — 3008F BODY MASS INDEX DOCD: CPT | Mod: S$GLB,,, | Performed by: OBSTETRICS & GYNECOLOGY

## 2023-01-27 PROCEDURE — 3078F DIAST BP <80 MM HG: CPT | Mod: S$GLB,,, | Performed by: OBSTETRICS & GYNECOLOGY

## 2023-01-27 PROCEDURE — 3074F PR MOST RECENT SYSTOLIC BLOOD PRESSURE < 130 MM HG: ICD-10-PCS | Mod: S$GLB,,, | Performed by: OBSTETRICS & GYNECOLOGY

## 2023-01-27 PROCEDURE — 1160F RVW MEDS BY RX/DR IN RCRD: CPT | Mod: S$GLB,,, | Performed by: OBSTETRICS & GYNECOLOGY

## 2023-01-27 PROCEDURE — 1159F PR MEDICATION LIST DOCUMENTED IN MEDICAL RECORD: ICD-10-PCS | Mod: S$GLB,,, | Performed by: OBSTETRICS & GYNECOLOGY

## 2023-01-27 PROCEDURE — 99396 PREV VISIT EST AGE 40-64: CPT | Mod: S$GLB,,, | Performed by: OBSTETRICS & GYNECOLOGY

## 2023-01-27 PROCEDURE — 3078F PR MOST RECENT DIASTOLIC BLOOD PRESSURE < 80 MM HG: ICD-10-PCS | Mod: S$GLB,,, | Performed by: OBSTETRICS & GYNECOLOGY

## 2023-01-27 PROCEDURE — 99396 PR PREVENTIVE VISIT,EST,40-64: ICD-10-PCS | Mod: S$GLB,,, | Performed by: OBSTETRICS & GYNECOLOGY

## 2023-01-27 PROCEDURE — 3074F SYST BP LT 130 MM HG: CPT | Mod: S$GLB,,, | Performed by: OBSTETRICS & GYNECOLOGY

## 2023-01-27 PROCEDURE — 3008F PR BODY MASS INDEX (BMI) DOCUMENTED: ICD-10-PCS | Mod: S$GLB,,, | Performed by: OBSTETRICS & GYNECOLOGY

## 2023-01-27 PROCEDURE — 1160F PR REVIEW ALL MEDS BY PRESCRIBER/CLIN PHARMACIST DOCUMENTED: ICD-10-PCS | Mod: S$GLB,,, | Performed by: OBSTETRICS & GYNECOLOGY

## 2023-01-27 RX ORDER — RIMEGEPANT SULFATE 75 MG/75MG
75 TABLET, ORALLY DISINTEGRATING ORAL DAILY PRN
COMMUNITY
Start: 2023-01-14 | End: 2023-06-06 | Stop reason: SDUPTHER

## 2023-01-27 NOTE — PROGRESS NOTES
"CC: Well woman exam    Gogo Muro is a 42 y.o. female  presents for well woman exam.  LMP: No LMP recorded. Patient has had a hysterectomy..   Patients last pap was unknown.  Last wellness labs were done 2022.  Last mammogram was 2022.   Primary care is [facility].  SBE yes  Birth control none.  No issues, problems, or complaints.  Due for breast exam today and has mammogram scheduled in a few weeks.  Also due for ovary exam today.  Doing well and pancreatitis and headaches are stable.      Chief Complaint   Patient presents with    Well Woman     Patient is here for annual exam.        Past Medical History:   Diagnosis Date    Hx of migraines     Pancreatitis     PONV (postoperative nausea and vomiting)      Past Surgical History:   Procedure Laterality Date     SECTION      x 3    EYE SURGERY      HYSTERECTOMY      2016     Social History     Socioeconomic History    Marital status:    Tobacco Use    Smoking status: Never    Smokeless tobacco: Never   Substance and Sexual Activity    Alcohol use: No    Drug use: No    Sexual activity: Yes     Partners: Male     Birth control/protection: See Surgical Hx     Comment: Hyst   Social History Narrative    ** Merged History Encounter **          Family History   Problem Relation Age of Onset    Hypertension Maternal Grandmother     Deep vein thrombosis Maternal Grandmother     Cancer Maternal Grandfather         Lung    Deep vein thrombosis Father     Stroke Brother     Hypertension Mother      OB History          3    Para   3    Term   3            AB        Living   3         SAB        IAB        Ectopic        Multiple        Live Births                     BP (!) 119/59   Ht 4' 8" (1.422 m)   Wt 40.3 kg (88 lb 12.8 oz)   BMI 19.91 kg/m²       ROS:  GENERAL: Denies weight gain or weight loss. Feeling well overall.   SKIN: Denies rash or lesions.   HEAD: Denies head injury or headache.   NODES: Denies enlarged " lymph nodes.   CHEST: Denies chest pain or shortness of breath.   CARDIOVASCULAR: Denies palpitations or left sided chest pain.   ABDOMEN: No abdominal pain, constipation, diarrhea, nausea, vomiting or rectal bleeding.   URINARY: No frequency, dysuria, hematuria, or burning on urination.  REPRODUCTIVE: See HPI.   BREASTS: The patient performs breast self-examination and denies pain, lumps, or nipple discharge.   HEMATOLOGIC: No easy bruisability or excessive bleeding.   MUSCULOSKELETAL: Denies joint pain or swelling.   NEUROLOGIC: Denies syncope or weakness.   PSYCHIATRIC: Denies depression, anxiety or mood swings.    PHYSICAL EXAM:  APPEARANCE: Well nourished, well developed, in no acute distress.  AFFECT: WNL, alert and oriented x 3  SKIN: No acne or hirsutism  NECK: Neck symmetric without masses or thyromegaly  NODES: No inguinal, cervical, or axillary lymph node enlargement  CHEST: Good respiratory effect  ABDOMEN: Soft.  No tenderness or masses.  No hernias.  BREASTS: Symmetrical, no skin changes or visible lesions.  No palpable masses, nipple discharge bilaterally.  PELVIC:  Deferred.  Abdominal exam of ovaries show they are normal size.  Uterus is absent.  Adnexa without masses or tenderness.    EXTREMITIES: No edema.    Periodic headache syndrome, not intractable    Breast cancer screening by mammogram    Hx of migraine headaches    Encounter for well woman exam with routine gynecological exam            Patient was counseled today on A.C.S. Pap guidelines and recommendations for yearly pelvic exams, mammograms and monthly self breast exams; to see her PCP for other health maintenance.     Follow up in about 1 year (around 1/27/2024) for Wellness and mammogram.

## 2023-02-10 ENCOUNTER — OFFICE VISIT (OUTPATIENT)
Dept: FAMILY MEDICINE | Facility: CLINIC | Age: 43
End: 2023-02-10
Payer: COMMERCIAL

## 2023-02-10 VITALS
TEMPERATURE: 99 F | SYSTOLIC BLOOD PRESSURE: 100 MMHG | DIASTOLIC BLOOD PRESSURE: 68 MMHG | BODY MASS INDEX: 19.46 KG/M2 | HEART RATE: 74 BPM | OXYGEN SATURATION: 100 % | WEIGHT: 86.5 LBS | HEIGHT: 56 IN

## 2023-02-10 DIAGNOSIS — F41.9 ANXIETY: Primary | ICD-10-CM

## 2023-02-10 DIAGNOSIS — Z86.69 HX OF MIGRAINE HEADACHES: ICD-10-CM

## 2023-02-10 DIAGNOSIS — U07.1 COVID: ICD-10-CM

## 2023-02-10 DIAGNOSIS — J06.9 UPPER RESPIRATORY TRACT INFECTION, UNSPECIFIED TYPE: ICD-10-CM

## 2023-02-10 DIAGNOSIS — R11.0 NAUSEA: ICD-10-CM

## 2023-02-10 LAB
CTP QC/QA: YES
CTP QC/QA: YES
FLUAV AG NPH QL: NEGATIVE
FLUBV AG NPH QL: NEGATIVE
SARS-COV-2 RDRP RESP QL NAA+PROBE: POSITIVE

## 2023-02-10 PROCEDURE — 1159F MED LIST DOCD IN RCRD: CPT | Mod: S$GLB,,, | Performed by: FAMILY MEDICINE

## 2023-02-10 PROCEDURE — 3074F SYST BP LT 130 MM HG: CPT | Mod: S$GLB,,, | Performed by: FAMILY MEDICINE

## 2023-02-10 PROCEDURE — 3008F PR BODY MASS INDEX (BMI) DOCUMENTED: ICD-10-PCS | Mod: S$GLB,,, | Performed by: FAMILY MEDICINE

## 2023-02-10 PROCEDURE — 1160F RVW MEDS BY RX/DR IN RCRD: CPT | Mod: S$GLB,,, | Performed by: FAMILY MEDICINE

## 2023-02-10 PROCEDURE — 99215 OFFICE O/P EST HI 40 MIN: CPT | Mod: S$GLB,,, | Performed by: FAMILY MEDICINE

## 2023-02-10 PROCEDURE — 3074F PR MOST RECENT SYSTOLIC BLOOD PRESSURE < 130 MM HG: ICD-10-PCS | Mod: S$GLB,,, | Performed by: FAMILY MEDICINE

## 2023-02-10 PROCEDURE — 99999 PR PBB SHADOW E&M-EST. PATIENT-LVL III: CPT | Mod: PBBFAC,,, | Performed by: FAMILY MEDICINE

## 2023-02-10 PROCEDURE — 3008F BODY MASS INDEX DOCD: CPT | Mod: S$GLB,,, | Performed by: FAMILY MEDICINE

## 2023-02-10 PROCEDURE — 3078F PR MOST RECENT DIASTOLIC BLOOD PRESSURE < 80 MM HG: ICD-10-PCS | Mod: S$GLB,,, | Performed by: FAMILY MEDICINE

## 2023-02-10 PROCEDURE — 99999 PR PBB SHADOW E&M-EST. PATIENT-LVL III: ICD-10-PCS | Mod: PBBFAC,,, | Performed by: FAMILY MEDICINE

## 2023-02-10 PROCEDURE — 87804 POCT INFLUENZA A/B: ICD-10-PCS | Mod: 59,QW,S$GLB, | Performed by: FAMILY MEDICINE

## 2023-02-10 PROCEDURE — 87635 SARS-COV-2 COVID-19 AMP PRB: CPT | Mod: QW,S$GLB,, | Performed by: FAMILY MEDICINE

## 2023-02-10 PROCEDURE — 1159F PR MEDICATION LIST DOCUMENTED IN MEDICAL RECORD: ICD-10-PCS | Mod: S$GLB,,, | Performed by: FAMILY MEDICINE

## 2023-02-10 PROCEDURE — 3078F DIAST BP <80 MM HG: CPT | Mod: S$GLB,,, | Performed by: FAMILY MEDICINE

## 2023-02-10 PROCEDURE — 1160F PR REVIEW ALL MEDS BY PRESCRIBER/CLIN PHARMACIST DOCUMENTED: ICD-10-PCS | Mod: S$GLB,,, | Performed by: FAMILY MEDICINE

## 2023-02-10 PROCEDURE — 87804 INFLUENZA ASSAY W/OPTIC: CPT | Mod: 59,QW,S$GLB, | Performed by: FAMILY MEDICINE

## 2023-02-10 PROCEDURE — 99215 PR OFFICE/OUTPT VISIT, EST, LEVL V, 40-54 MIN: ICD-10-PCS | Mod: S$GLB,,, | Performed by: FAMILY MEDICINE

## 2023-02-10 PROCEDURE — 87635: ICD-10-PCS | Mod: QW,S$GLB,, | Performed by: FAMILY MEDICINE

## 2023-02-10 RX ORDER — ALPRAZOLAM 0.5 MG/1
0.5 TABLET ORAL 3 TIMES DAILY PRN
Qty: 30 TABLET | Refills: 1 | Status: SHIPPED | OUTPATIENT
Start: 2023-02-10 | End: 2023-11-10 | Stop reason: SDUPTHER

## 2023-02-10 RX ORDER — SCOLOPAMINE TRANSDERMAL SYSTEM 1 MG/1
1 PATCH, EXTENDED RELEASE TRANSDERMAL
Qty: 4 PATCH | Refills: 0 | Status: SHIPPED | OUTPATIENT
Start: 2023-02-10

## 2023-02-10 RX ORDER — ALBUTEROL SULFATE 90 UG/1
2 AEROSOL, METERED RESPIRATORY (INHALATION) EVERY 4 HOURS PRN
Qty: 18 G | Refills: 1 | Status: SHIPPED | OUTPATIENT
Start: 2023-02-10 | End: 2024-02-06 | Stop reason: ALTCHOICE

## 2023-02-10 RX ORDER — BENZONATATE 200 MG/1
200 CAPSULE ORAL 3 TIMES DAILY PRN
Qty: 30 CAPSULE | Refills: 0 | Status: SHIPPED | OUTPATIENT
Start: 2023-02-10 | End: 2023-02-20

## 2023-02-10 RX ORDER — METHYLPREDNISOLONE 4 MG/1
TABLET ORAL
Qty: 21 EACH | Refills: 0 | Status: SHIPPED | OUTPATIENT
Start: 2023-02-10 | End: 2023-03-03

## 2023-02-10 RX ORDER — DIPHENOXYLATE HYDROCHLORIDE AND ATROPINE SULFATE 2.5; .025 MG/1; MG/1
1 TABLET ORAL
Qty: 20 TABLET | Refills: 0 | Status: SHIPPED | OUTPATIENT
Start: 2023-02-10 | End: 2023-02-20

## 2023-02-10 RX ORDER — BUTALBITAL, ACETAMINOPHEN AND CAFFEINE 50; 325; 40 MG/1; MG/1; MG/1
1 TABLET ORAL EVERY 6 HOURS PRN
Qty: 30 TABLET | Refills: 5 | Status: SHIPPED | OUTPATIENT
Start: 2023-02-10 | End: 2023-06-06 | Stop reason: SDUPTHER

## 2023-02-10 RX ORDER — DOXYCYCLINE HYCLATE 100 MG
100 TABLET ORAL 2 TIMES DAILY
Qty: 20 TABLET | Refills: 0 | Status: SHIPPED | OUTPATIENT
Start: 2023-02-10 | End: 2024-02-06 | Stop reason: ALTCHOICE

## 2023-02-10 NOTE — PROGRESS NOTES
Subjective:       Patient ID: Gogo Muro is a 42 y.o. female.    Chief Complaint: Cough    43yo female here with complaint of pressure in cheeks, itchy nose, and throat irritation. Today feels much worse--hurts all over, has HA, stomach has been upset. Body aches and dry cough also. Has not tried any OTC meds. Did get COVID vaccine but not the most recent booster. Coworker tested positive for COVID 22, pt was around her about 4 days earlier before the test positive. Patient states she rarely runs a fever.    Has upcoming cruise next month. Needs medications for the trip. Did well with Xanax when prepping and during flying for [place] trip recently. Needs refill for the cruise. Also has concerns with motion sickness, has trouble with it even riding in the car. She was able to     Before getting sick this week, she has been taking Miralax regularly and bowel movements have been much more regular.         Depression Patient Health Questionnaire 2023   Over the last two weeks how often have you been bothered by little interest or pleasure in doing things Not at all Not at all   Over the last two weeks how often have you been bothered by feeling down, depressed or hopeless Not at all Not at all   PHQ-2 Total Score 0 0     Review of Systems   Respiratory:  Positive for cough.        Past Medical History:   Diagnosis Date    Hx of migraines     Pancreatitis     PONV (postoperative nausea and vomiting)      Past Surgical History:   Procedure Laterality Date     SECTION      x 3    EYE SURGERY      HYSTERECTOMY      2016     Family History   Problem Relation Age of Onset    Hypertension Mother     Deep vein thrombosis Father     Hypertension Maternal Grandmother     Deep vein thrombosis Maternal Grandmother     Cancer Maternal Grandfather         Lung    Stroke Brother     Breast cancer Neg Hx        Review of patient's allergies indicates:   Allergen Reactions     Sulfamethoxazole-trimethoprim     Cephalexin Rash    Ibuprofen Palpitations    Sulfa (sulfonamide antibiotics) Rash       Current Outpatient Medications:     ALPRAZolam (XANAX) 0.5 MG tablet, Take 1 tablet (0.5 mg total) by mouth 3 (three) times daily as needed for Anxiety., Disp: 30 tablet, Rfl: 1    hyoscyamine (LEVSIN/SL) 0.125 mg Subl, Place 1 tablet (0.125 mg total) under the tongue every 4 (four) hours as needed (nausea or stomach cramping)., Disp: 30 tablet, Rfl: 1    neomycin-polymyxin-dexamethasone (MAXITROL) 3.5 mg/g-10,000 unit/g-0.1 % Oint, Place into the left eye 3 (three) times daily., Disp: 3.5 g, Rfl: 1    NURTEC 75 mg odt, Take 75 mg by mouth daily as needed., Disp: , Rfl:     ondansetron (ZOFRAN) 8 MG tablet, Take 1 tablet (8 mg total) by mouth every 6 (six) hours as needed for Nausea., Disp: 90 tablet, Rfl: 0    ondansetron (ZOFRAN-ODT) 4 MG TbDL, Take 1 tablet (4 mg total) by mouth every 6 (six) hours as needed (nausea)., Disp: 30 tablet, Rfl: 1    ondansetron (ZOFRAN-ODT) 4 MG TbDL, Take 1 tablet (4 mg total) by mouth every 6 (six) hours as needed (nausea or vomiting)., Disp: 90 tablet, Rfl: 1    albuterol (PROVENTIL HFA) 90 mcg/actuation inhaler, Inhale 2 puffs into the lungs every 4 (four) hours as needed for Wheezing or Shortness of Breath. Rescue, Disp: 18 g, Rfl: 1    benzonatate (TESSALON) 200 MG capsule, Take 1 capsule (200 mg total) by mouth 3 (three) times daily as needed for Cough., Disp: 30 capsule, Rfl: 0    butalbital-acetaminophen-caffeine -40 mg (FIORICET, ESGIC) -40 mg per tablet, Take 1 tablet by mouth every 6 (six) hours as needed for Headaches., Disp: 30 tablet, Rfl: 5    diphenoxylate-atropine 2.5-0.025 mg (LOMOTIL) 2.5-0.025 mg per tablet, Take 1 tablet by mouth after meals as needed for Diarrhea., Disp: 20 tablet, Rfl: 0    doxycycline (VIBRA-TABS) 100 MG tablet, Take 1 tablet (100 mg total) by mouth 2 (two) times daily., Disp: 20 tablet, Rfl: 0     "methylPREDNISolone (MEDROL DOSEPACK) 4 mg tablet, use as directed, Disp: 21 each, Rfl: 0    scopolamine (TRANSDERM-SCOP) 1.3-1.5 mg (1 mg over 3 days), Place 1 patch onto the skin every 72 hours., Disp: 4 patch, Rfl: 0      Objective:      /68 (BP Location: Left arm, Patient Position: Sitting, BP Method: Small (Manual))   Pulse 74   Temp 98.7 °F (37.1 °C) (Tympanic)   Ht 4' 8" (1.422 m)   Wt 39.3 kg (86 lb 8.5 oz)   SpO2 100%   BMI 19.40 kg/m²   Physical Exam  Vitals and nursing note reviewed.   Constitutional:       Appearance: Normal appearance. She is ill-appearing. She is not toxic-appearing or diaphoretic.   HENT:      Head: Normocephalic and atraumatic.      Right Ear: Tympanic membrane, ear canal and external ear normal. There is no impacted cerumen.      Left Ear: Tympanic membrane, ear canal and external ear normal. There is no impacted cerumen.      Nose: Congestion present.      Mouth/Throat:      Pharynx: Posterior oropharyngeal erythema present. No oropharyngeal exudate.   Eyes:      General: No scleral icterus.        Right eye: No discharge.         Left eye: No discharge.      Extraocular Movements: Extraocular movements intact.      Conjunctiva/sclera: Conjunctivae normal.      Pupils: Pupils are equal, round, and reactive to light.   Cardiovascular:      Rate and Rhythm: Normal rate and regular rhythm.      Heart sounds: Normal heart sounds. No murmur heard.  Pulmonary:      Effort: Pulmonary effort is normal. No respiratory distress.      Breath sounds: Normal breath sounds. No wheezing, rhonchi or rales.   Abdominal:      General: Abdomen is flat. There is no distension.   Musculoskeletal:      Cervical back: Neck supple. No tenderness.   Lymphadenopathy:      Cervical: No cervical adenopathy.   Skin:     Capillary Refill: Capillary refill takes less than 2 seconds.   Neurological:      General: No focal deficit present.   Psychiatric:         Mood and Affect: Mood is anxious.         " Behavior: Behavior normal.         Thought Content: Thought content normal.         Judgment: Judgment normal.       Assessment:       1. Anxiety    2. Upper respiratory tract infection, unspecified type    3. Nausea    4. COVID    5. Hx of migraine headaches        Plan:       Anxiety  -     ALPRAZolam (XANAX) 0.5 MG tablet; Take 1 tablet (0.5 mg total) by mouth 3 (three) times daily as needed for Anxiety.  Dispense: 30 tablet; Refill: 1    Upper respiratory tract infection, unspecified type  -     POCT COVID-19 Rapid Screening  -     POCT Influenza A/B    Nausea  -     scopolamine (TRANSDERM-SCOP) 1.3-1.5 mg (1 mg over 3 days); Place 1 patch onto the skin every 72 hours.  Dispense: 4 patch; Refill: 0    COVID  -     methylPREDNISolone (MEDROL DOSEPACK) 4 mg tablet; use as directed  Dispense: 21 each; Refill: 0  -     benzonatate (TESSALON) 200 MG capsule; Take 1 capsule (200 mg total) by mouth 3 (three) times daily as needed for Cough.  Dispense: 30 capsule; Refill: 0  -     albuterol (PROVENTIL HFA) 90 mcg/actuation inhaler; Inhale 2 puffs into the lungs every 4 (four) hours as needed for Wheezing or Shortness of Breath. Rescue  Dispense: 18 g; Refill: 1  -     diphenoxylate-atropine 2.5-0.025 mg (LOMOTIL) 2.5-0.025 mg per tablet; Take 1 tablet by mouth after meals as needed for Diarrhea.  Dispense: 20 tablet; Refill: 0    Hx of migraine headaches  -     butalbital-acetaminophen-caffeine -40 mg (FIORICET, ESGIC) -40 mg per tablet; Take 1 tablet by mouth every 6 (six) hours as needed for Headaches.  Dispense: 30 tablet; Refill: 5    Other orders  -     doxycycline (VIBRA-TABS) 100 MG tablet; Take 1 tablet (100 mg total) by mouth 2 (two) times daily.  Dispense: 20 tablet; Refill: 0    Can take Zofran if needed also.  Meclizine (Bonine brand chewable) is very helpful for her, but Dramamine does not help.  Start the meclinzine before the crusie and continue for a few days after to help with prev of  migraine or motion sickness.   Take Miralax and Pepto-Bismol on the ship.  Doxycycline Rx in case of infection while on the trip.  RF Fioricet.      COVID positive.  Guaifenesin 400-600mg up to every 6 hours for thinning mucous.  Albuterol inhaler prn.  Vitamin D 4,000IU daily for about 2 weeks then 2,000 IU daily.  Tylenol 500mg every 4-6 hours for fever or body aches (allergy to IBU.)          Previous records in Epic were reviewed, including the last 3 months of encounters, imaging, laboratory, and pathology reports.    Strict return precautions reviewed and patient verbalized understanding. Risks, benefits, and alternatives to the plan were reviewed in detail and all questions answered to the patient's satisfaction. Patient agrees to return to clinic or ER if symptoms worsen. 40 minutes total were spent on today's visit, not limited to but including time based on counseling and coordination of care.    Patient instructed that best way to communicate with my office staff is for patient to get on the [facility] portal to expedite communication and communication issues that may occur.  Patient was given instructions on how to get on the portal.  I encouraged patient to obtain portal access as well.  Ultimately it is up to the patient to obtain access.  Patient voiced understanding.    This note was created using [employer] voice recognition software that occasionally may misinterpret phrases or words.    Follow up if symptoms worsen or fail to improve.

## 2023-02-10 NOTE — LETTER
February 10, 2023      [facility]  [address] [place] [address]  Phone: [phone]       Patient: Gogo Muro   YOB: 1980  Date of Visit: 02/10/2023    To Whom It May Concern:    Fernie Muro  was at [facility] on 02/10/2023. The patient may return to work on 2/20/2023 with no restrictions. If you have any questions or concerns, or if I can be of further assistance, please do not hesitate to contact me.    Sincerely,    Camila Atkins MD

## 2023-02-14 ENCOUNTER — PATIENT MESSAGE (OUTPATIENT)
Dept: FAMILY MEDICINE | Facility: CLINIC | Age: 43
End: 2023-02-14
Payer: COMMERCIAL

## 2023-02-17 ENCOUNTER — HOSPITAL ENCOUNTER (OUTPATIENT)
Dept: RADIOLOGY | Facility: HOSPITAL | Age: 43
Discharge: HOME | End: 2023-02-17
Attending: OBSTETRICS & GYNECOLOGY
Payer: COMMERCIAL

## 2023-02-17 VITALS — HEIGHT: 56 IN | WEIGHT: 86 LBS | BODY MASS INDEX: 19.35 KG/M2

## 2023-02-17 DIAGNOSIS — Z12.31 ENCOUNTER FOR SCREENING MAMMOGRAM FOR BREAST CANCER: ICD-10-CM

## 2023-02-17 PROCEDURE — 77063 BREAST TOMOSYNTHESIS BI: CPT | Mod: 26,,, | Performed by: RADIOLOGY

## 2023-02-17 PROCEDURE — 77067 SCR MAMMO BI INCL CAD: CPT | Mod: 26,,, | Performed by: RADIOLOGY

## 2023-02-17 PROCEDURE — 77063 MAMMO DIGITAL SCREENING BILAT WITH TOMO: ICD-10-PCS | Mod: 26,,, | Performed by: RADIOLOGY

## 2023-02-17 PROCEDURE — 77067 SCR MAMMO BI INCL CAD: CPT | Mod: TC

## 2023-02-17 PROCEDURE — 77067 MAMMO DIGITAL SCREENING BILAT WITH TOMO: ICD-10-PCS | Mod: 26,,, | Performed by: RADIOLOGY

## 2023-03-08 ENCOUNTER — PATIENT MESSAGE (OUTPATIENT)
Dept: FAMILY MEDICINE | Facility: CLINIC | Age: 43
End: 2023-03-08
Payer: COMMERCIAL

## 2023-03-08 NOTE — TELEPHONE ENCOUNTER
Dear Dr. Asencio,     In the absence of Dr. Camila Grajeda, can you please address this patients concern or request?    Thank you,  Chica Mendez MA

## 2023-03-17 ENCOUNTER — TELEPHONE (OUTPATIENT)
Dept: FAMILY MEDICINE | Facility: CLINIC | Age: 43
End: 2023-03-17
Payer: COMMERCIAL

## 2023-03-17 NOTE — TELEPHONE ENCOUNTER
----- Message from Bette Martínez sent at 3/17/2023  9:02 AM CDT -----  Regarding: advice  Contact: patient  Type: Needs Medical Advice  Who Called:  patient  Symptoms (please be specific):    How long has patient had these symptoms:    Pharmacy name and phone #:    Best Call Back Number: [phone] (home)   Additional Information: Patient is calling to make sure it is safe to use the scopolamine and the Xanax together. Please call patient to advise.Thanks!

## 2023-05-23 ENCOUNTER — PATIENT MESSAGE (OUTPATIENT)
Dept: ADMINISTRATIVE | Facility: HOSPITAL | Age: 43
End: 2023-05-23
Payer: COMMERCIAL

## 2023-06-06 ENCOUNTER — OFFICE VISIT (OUTPATIENT)
Dept: FAMILY MEDICINE | Facility: CLINIC | Age: 43
End: 2023-06-06
Payer: COMMERCIAL

## 2023-06-06 VITALS
BODY MASS INDEX: 19.61 KG/M2 | WEIGHT: 87.19 LBS | OXYGEN SATURATION: 99 % | DIASTOLIC BLOOD PRESSURE: 68 MMHG | HEIGHT: 56 IN | HEART RATE: 79 BPM | SYSTOLIC BLOOD PRESSURE: 106 MMHG

## 2023-06-06 DIAGNOSIS — M54.12 CERVICAL RADICULOPATHY: Primary | ICD-10-CM

## 2023-06-06 DIAGNOSIS — Z86.69 HX OF MIGRAINE HEADACHES: ICD-10-CM

## 2023-06-06 DIAGNOSIS — G43.109 MIGRAINE WITH AURA AND WITHOUT STATUS MIGRAINOSUS, NOT INTRACTABLE: ICD-10-CM

## 2023-06-06 DIAGNOSIS — T14.8XXA MUSCLE STRAIN: ICD-10-CM

## 2023-06-06 DIAGNOSIS — F41.8 SITUATIONAL ANXIETY: ICD-10-CM

## 2023-06-06 DIAGNOSIS — M62.838 MUSCLE SPASM: ICD-10-CM

## 2023-06-06 PROCEDURE — 1159F MED LIST DOCD IN RCRD: CPT | Mod: S$GLB,,, | Performed by: FAMILY MEDICINE

## 2023-06-06 PROCEDURE — 99999 PR PBB SHADOW E&M-EST. PATIENT-LVL IV: CPT | Mod: PBBFAC,,, | Performed by: FAMILY MEDICINE

## 2023-06-06 PROCEDURE — 1160F PR REVIEW ALL MEDS BY PRESCRIBER/CLIN PHARMACIST DOCUMENTED: ICD-10-PCS | Mod: S$GLB,,, | Performed by: FAMILY MEDICINE

## 2023-06-06 PROCEDURE — 3078F DIAST BP <80 MM HG: CPT | Mod: S$GLB,,, | Performed by: FAMILY MEDICINE

## 2023-06-06 PROCEDURE — 3078F PR MOST RECENT DIASTOLIC BLOOD PRESSURE < 80 MM HG: ICD-10-PCS | Mod: S$GLB,,, | Performed by: FAMILY MEDICINE

## 2023-06-06 PROCEDURE — 3008F BODY MASS INDEX DOCD: CPT | Mod: S$GLB,,, | Performed by: FAMILY MEDICINE

## 2023-06-06 PROCEDURE — 1160F RVW MEDS BY RX/DR IN RCRD: CPT | Mod: S$GLB,,, | Performed by: FAMILY MEDICINE

## 2023-06-06 PROCEDURE — 99215 PR OFFICE/OUTPT VISIT, EST, LEVL V, 40-54 MIN: ICD-10-PCS | Mod: S$GLB,,, | Performed by: FAMILY MEDICINE

## 2023-06-06 PROCEDURE — 3074F PR MOST RECENT SYSTOLIC BLOOD PRESSURE < 130 MM HG: ICD-10-PCS | Mod: S$GLB,,, | Performed by: FAMILY MEDICINE

## 2023-06-06 PROCEDURE — 3074F SYST BP LT 130 MM HG: CPT | Mod: S$GLB,,, | Performed by: FAMILY MEDICINE

## 2023-06-06 PROCEDURE — 99999 PR PBB SHADOW E&M-EST. PATIENT-LVL IV: ICD-10-PCS | Mod: PBBFAC,,, | Performed by: FAMILY MEDICINE

## 2023-06-06 PROCEDURE — 1159F PR MEDICATION LIST DOCUMENTED IN MEDICAL RECORD: ICD-10-PCS | Mod: S$GLB,,, | Performed by: FAMILY MEDICINE

## 2023-06-06 PROCEDURE — 3008F PR BODY MASS INDEX (BMI) DOCUMENTED: ICD-10-PCS | Mod: S$GLB,,, | Performed by: FAMILY MEDICINE

## 2023-06-06 PROCEDURE — 99215 OFFICE O/P EST HI 40 MIN: CPT | Mod: S$GLB,,, | Performed by: FAMILY MEDICINE

## 2023-06-06 RX ORDER — BUTALBITAL, ACETAMINOPHEN AND CAFFEINE 50; 325; 40 MG/1; MG/1; MG/1
1 TABLET ORAL EVERY 6 HOURS PRN
Qty: 30 TABLET | Refills: 5 | Status: SHIPPED | OUTPATIENT
Start: 2023-06-06 | End: 2023-10-04 | Stop reason: SDUPTHER

## 2023-06-06 RX ORDER — DIPHENOXYLATE HYDROCHLORIDE AND ATROPINE SULFATE 2.5; .025 MG/1; MG/1
1 TABLET ORAL
COMMUNITY
Start: 2023-03-06

## 2023-06-06 RX ORDER — METHYLPREDNISOLONE 4 MG/1
TABLET ORAL
Qty: 21 EACH | Refills: 0 | Status: SHIPPED | OUTPATIENT
Start: 2023-06-06 | End: 2023-06-27

## 2023-06-06 RX ORDER — CYCLOBENZAPRINE HCL 5 MG
5 TABLET ORAL 2 TIMES DAILY PRN
COMMUNITY
Start: 2023-05-30

## 2023-06-06 RX ORDER — BUTALBITAL, ACETAMINOPHEN AND CAFFEINE 50; 325; 40 MG/1; MG/1; MG/1
1 TABLET ORAL EVERY 6 HOURS PRN
Qty: 30 TABLET | Refills: 5 | Status: SHIPPED | OUTPATIENT
Start: 2023-06-06 | End: 2023-06-06

## 2023-06-06 RX ORDER — RIMEGEPANT SULFATE 75 MG/75MG
75 TABLET, ORALLY DISINTEGRATING ORAL DAILY PRN
Qty: 16 TABLET | Refills: 2 | Status: SHIPPED | OUTPATIENT
Start: 2023-06-06

## 2023-06-06 RX ORDER — METHOCARBAMOL 500 MG/1
500 TABLET, FILM COATED ORAL 4 TIMES DAILY PRN
Qty: 40 TABLET | Refills: 0 | Status: SHIPPED | OUTPATIENT
Start: 2023-06-06 | End: 2023-06-16

## 2023-06-06 NOTE — PROGRESS NOTES
Subjective:       Patient ID: Gogo Muro is a 43 y.o. female.    Chief Complaint: Neck Pain    Recently seen at Urgent Care 1 week ago for shoulder pain. The weekend prior, she was trimming hedges, got stung by bees, so she stopped the yard work. When she awakened the next day, she had difficulty even moving her neck. States she had HA, neck and shoulder pain. She saw chiropractor last week on Thursday and Friday of last week and yesterday as well. States she still has to be careful with head movements and her sleep position or she has repeat of the pain. She had resolution of the RUE weakness after chiro visit. She did take Flexeril and reports it was helpful for getting her shoulders to relax but not helpful for pain relief.     Similar symptoms a couple of months ago with coughing during URI. Had some neck and shoulder pain/tension then but that resolved.    She did not get relief from ice or heat, but made it less uncomfortable temporarily.    Had her  use the massage gun, was helpful only temporarily.    Increased anxiety, increased in migraine frequency as well. Requests refill on Fioricet.    [facility]:  'Assessment & Plan:   Visit Diagnosis: Patient symptoms. Cervical radiculopathy. She has an allergy to ibuprofen so we will start low-dose levothyroxine at this time and she can continue Tylenol, heat ice therapy, and lidocaine patches. I will also obtain an xray and will update patient with results.    Gogo was seen today for neck pain.    Diagnoses and all orders for this visit:    Cervical radiculopathy  - cyclobenzaprine (FLEXERIL) 5 MG tablet; Take one tablet (5 mg total) by mouth 3 (three) times daily as needed for Muscle spasms  - XR SPINE CERV =>4 VIEWS; Future    Follow Up:   Return if symptoms worsen or fail to improve.    EXAMINATION:   XR SPINE CERV =>4 VIEWS,  5/30/2023     CLINICAL HISTORY:   42 y/o  Female,   Cervical radiculopathy     COMPARISON:   No previous comparable  studies are available for review.     FINDINGS:   Alignment is anatomic.  No fracture or subluxation.     Mild facet arthrosis at C5-C7 and C2-3.  Disc spaces are fairly well   maintained.  No foraminal narrowing.     Soft tissues are normal.     Airway is patent.  Lung apices are well aerated.     IMPRESSION:   1. Mild facet arthrosis.  No acute findings.  Follow up as clinically   indicated.  Exam End: 23 10:58'       She was given Flexeril, and instructed to continue ice, heat, Tylenol, topical lidocaine.            Depression Patient Health Questionnaire 2023   Over the last two weeks how often have you been bothered by little interest or pleasure in doing things Not at all Not at all   Over the last two weeks how often have you been bothered by feeling down, depressed or hopeless Not at all Not at all   PHQ-2 Total Score 0 0     Review of Systems   Constitutional:  Negative for activity change and unexpected weight change.   HENT:  Negative for hearing loss, rhinorrhea and trouble swallowing.    Eyes:  Negative for discharge and visual disturbance.   Respiratory:  Negative for chest tightness and wheezing.    Cardiovascular:  Negative for chest pain and palpitations.   Gastrointestinal:  Negative for blood in stool, constipation, diarrhea and vomiting.   Endocrine: Negative for polydipsia and polyuria.   Genitourinary:  Negative for difficulty urinating, dysuria, hematuria and menstrual problem.   Musculoskeletal:  Positive for neck pain. Negative for arthralgias and joint swelling.   Neurological:  Positive for headaches. Negative for weakness.   Psychiatric/Behavioral:  Negative for confusion and dysphoric mood.    All other systems reviewed and are negative.      Past Medical History:   Diagnosis Date    Hx of migraines     Pancreatitis     PONV (postoperative nausea and vomiting)      Past Surgical History:   Procedure Laterality Date     SECTION      x 3    EYE SURGERY       HYSTERECTOMY      2016     Family History   Problem Relation Age of Onset    Hypertension Mother     Deep vein thrombosis Father     Hypertension Maternal Grandmother     Deep vein thrombosis Maternal Grandmother     Cancer Maternal Grandfather         Lung    Stroke Brother     Breast cancer Neg Hx        Review of patient's allergies indicates:   Allergen Reactions    Sulfamethoxazole-trimethoprim     Cephalexin Rash    Ibuprofen Palpitations    Sulfa (sulfonamide antibiotics) Rash       Current Outpatient Medications:     albuterol (PROVENTIL HFA) 90 mcg/actuation inhaler, Inhale 2 puffs into the lungs every 4 (four) hours as needed for Wheezing or Shortness of Breath. Rescue, Disp: 18 g, Rfl: 1    ALPRAZolam (XANAX) 0.5 MG tablet, Take 1 tablet (0.5 mg total) by mouth 3 (three) times daily as needed for Anxiety., Disp: 30 tablet, Rfl: 1    butalbital-acetaminophen-caffeine -40 mg (FIORICET, ESGIC) -40 mg per tablet, Take 1 tablet by mouth every 6 (six) hours as needed for Headaches., Disp: 30 tablet, Rfl: 5    cyclobenzaprine (FLEXERIL) 5 MG tablet, Take 5 mg by mouth 2 (two) times daily as needed., Disp: , Rfl:     diphenoxylate-atropine 2.5-0.025 mg (LOMOTIL) 2.5-0.025 mg per tablet, Take 1 tablet by mouth as needed., Disp: , Rfl:     doxycycline (VIBRA-TABS) 100 MG tablet, Take 1 tablet (100 mg total) by mouth 2 (two) times daily., Disp: 20 tablet, Rfl: 0    hyoscyamine (LEVSIN/SL) 0.125 mg Subl, Place 1 tablet (0.125 mg total) under the tongue every 4 (four) hours as needed (nausea or stomach cramping)., Disp: 30 tablet, Rfl: 1    methocarbamoL (ROBAXIN) 500 MG Tab, Take 1 tablet (500 mg total) by mouth 4 (four) times daily as needed (muscle spasm)., Disp: 40 tablet, Rfl: 0    methylPREDNISolone (MEDROL DOSEPACK) 4 mg tablet, use as directed, Disp: 21 each, Rfl: 0    neomycin-polymyxin-dexamethasone (MAXITROL) 3.5 mg/g-10,000 unit/g-0.1 % Oint, Place into the left eye 3 (three) times daily.,  "Disp: 3.5 g, Rfl: 1    NURTEC 75 mg odt, Take 1 tablet (75 mg total) by mouth daily as needed for Migraine., Disp: 16 tablet, Rfl: 2    ondansetron (ZOFRAN) 8 MG tablet, Take 1 tablet (8 mg total) by mouth every 6 (six) hours as needed for Nausea., Disp: 90 tablet, Rfl: 0    ondansetron (ZOFRAN-ODT) 4 MG TbDL, Take 1 tablet (4 mg total) by mouth every 6 (six) hours as needed (nausea or vomiting)., Disp: 90 tablet, Rfl: 1    ondansetron (ZOFRAN-ODT) 4 MG TbDL, DISSOLVE 1 TABLET(4 MG) ON THE TONGUE EVERY 6 HOURS AS NEEDED FOR NAUSEA, Disp: 30 tablet, Rfl: 1    scopolamine (TRANSDERM-SCOP) 1.3-1.5 mg (1 mg over 3 days), Place 1 patch onto the skin every 72 hours., Disp: 4 patch, Rfl: 0      Objective:      /68 (BP Location: Right arm, Patient Position: Sitting)   Pulse 79   Ht 4' 8" (1.422 m)   Wt 39.5 kg (87 lb 3.1 oz)   SpO2 99%   BMI 19.55 kg/m²   Physical Exam  Vitals and nursing note reviewed.   Constitutional:       General: She is not in acute distress.     Appearance: Normal appearance. She is well-developed and normal weight. She is not ill-appearing, toxic-appearing or diaphoretic.   HENT:      Head: Normocephalic and atraumatic.      Right Ear: Tympanic membrane, ear canal and external ear normal. There is no impacted cerumen.      Left Ear: Tympanic membrane and external ear normal. There is no impacted cerumen.      Mouth/Throat:      Pharynx: No oropharyngeal exudate.   Eyes:      General: No scleral icterus.        Right eye: No discharge.         Left eye: No discharge.      Extraocular Movements: Extraocular movements intact.      Conjunctiva/sclera: Conjunctivae normal.      Pupils: Pupils are equal, round, and reactive to light.   Neck:      Thyroid: No thyromegaly.      Vascular: No JVD.      Trachea: No tracheal deviation.        Comments: Tenderness in ehsan of X, red with muscle spasm, blue is spinous process.  Cardiovascular:      Rate and Rhythm: Normal rate and regular rhythm.      " Pulses: Normal pulses.      Heart sounds: Normal heart sounds. No murmur heard.    No friction rub. No gallop.   Pulmonary:      Effort: Pulmonary effort is normal. No respiratory distress.      Breath sounds: Normal breath sounds. No wheezing, rhonchi or rales.   Abdominal:      General: Abdomen is flat. There is no distension.   Musculoskeletal:         General: Normal range of motion.      Cervical back: Neck supple. No tenderness.      Right lower leg: No edema.      Left lower leg: No edema.   Lymphadenopathy:      Cervical: No cervical adenopathy.   Skin:     General: Skin is warm and dry.      Capillary Refill: Capillary refill takes less than 2 seconds.      Coloration: Skin is not jaundiced or pale.      Findings: No erythema or rash.   Neurological:      General: No focal deficit present.      Mental Status: She is alert and oriented to person, place, and time. Mental status is at baseline.      Cranial Nerves: No cranial nerve deficit.      Motor: No weakness.      Coordination: Coordination normal.      Gait: Gait normal.   Psychiatric:         Mood and Affect: Mood normal.         Behavior: Behavior normal.         Thought Content: Thought content normal.         Judgment: Judgment normal.       Assessment:       1. Cervical radiculopathy    2. Muscle spasm    3. Migraine with aura and without status migrainosus, not intractable    4. Situational anxiety    5. Muscle strain    6. Hx of migraine headaches        Plan:       Cervical radiculopathy  -     methylPREDNISolone (MEDROL DOSEPACK) 4 mg tablet; use as directed  Dispense: 21 each; Refill: 0  -     methocarbamoL (ROBAXIN) 500 MG Tab; Take 1 tablet (500 mg total) by mouth 4 (four) times daily as needed (muscle spasm).  Dispense: 40 tablet; Refill: 0    Muscle spasm  -     methocarbamoL (ROBAXIN) 500 MG Tab; Take 1 tablet (500 mg total) by mouth 4 (four) times daily as needed (muscle spasm).  Dispense: 40 tablet; Refill: 0    Migraine with aura and  without status migrainosus, not intractable  -     Discontinue: butalbital-acetaminophen-caffeine -40 mg (FIORICET, ESGIC) -40 mg per tablet; Take 1 tablet by mouth every 6 (six) hours as needed for Headaches.  Dispense: 30 tablet; Refill: 5  -     NURTEC 75 mg odt; Take 1 tablet (75 mg total) by mouth daily as needed for Migraine.  Dispense: 16 tablet; Refill: 2  -     butalbital-acetaminophen-caffeine -40 mg (FIORICET, ESGIC) -40 mg per tablet; Take 1 tablet by mouth every 6 (six) hours as needed for Headaches.  Dispense: 30 tablet; Refill: 5    Situational anxiety    Muscle strain  -     methylPREDNISolone (MEDROL DOSEPACK) 4 mg tablet; use as directed  Dispense: 21 each; Refill: 0    Hx of migraine headaches    Massage gun  Trial of Robaxin 250-500mg morning and afternoon, can take Flexeril at bedtime  Discussed TPI or dry needling, pt wants to give that some thought  Refill Fioricet, which she is taking frequently. Recommend a preventive, and will see if ins will cover Nurtec as preventive, take QOD for migraine prevention  Situational anxiety has peaked as well, she has alprazolam to take prn  If not better with above, use medrol dose pack as she cannot take NSAIDs (IBU allergy)    Outside records reviewed.  Chiro out of office x 2 weeks.  Pt to call or msg if pain worsens.        Previous records in Epic were reviewed, including the last 3 months of encounters, imaging, laboratory, and pathology reports.    Strict return precautions reviewed and patient verbalized understanding. Risks, benefits, and alternatives to the plan were reviewed in detail and all questions answered to the patient's satisfaction. Patient agrees to return to clinic or ER if symptoms worsen. 40 minutes total were spent on today's visit, not limited to but including time based on counseling and coordination of care.    Patient instructed that best way to communicate with my office staff is for patient to get on  the [facility] patient portal to expedite communication and communication issues that may occur.  Patient was given instructions on how to get on the portal.  I encouraged patient to obtain portal access as well.  Ultimately it is up to the patient to obtain access.  Patient voiced understanding.    This note was created using [employer] voice recognition software that occasionally may misinterpret phrases or words.    Follow up if symptoms worsen or fail to improve, for or for routine checkup in 3 months.

## 2023-07-03 ENCOUNTER — DOCUMENTATION ONLY (OUTPATIENT)
Dept: FAMILY MEDICINE | Facility: CLINIC | Age: 43
End: 2023-07-03
Payer: COMMERCIAL

## 2023-07-03 NOTE — PROGRESS NOTES
The Prior Authorization request has been approved for [facility] [place].  The authorization is valid from 06/03/2023 through 07/02/2024

## 2023-09-06 ENCOUNTER — OFFICE VISIT (OUTPATIENT)
Dept: FAMILY MEDICINE | Facility: CLINIC | Age: 43
End: 2023-09-06
Payer: COMMERCIAL

## 2023-09-06 VITALS — OXYGEN SATURATION: 100 % | SYSTOLIC BLOOD PRESSURE: 100 MMHG | HEART RATE: 70 BPM | DIASTOLIC BLOOD PRESSURE: 60 MMHG

## 2023-09-06 DIAGNOSIS — K59.04 CHRONIC IDIOPATHIC CONSTIPATION: Primary | ICD-10-CM

## 2023-09-06 DIAGNOSIS — R35.0 URINARY FREQUENCY: ICD-10-CM

## 2023-09-06 DIAGNOSIS — R10.10 UPPER ABDOMINAL PAIN: ICD-10-CM

## 2023-09-06 LAB
BILIRUBIN, UA POC OHS: NEGATIVE
BLOOD, UA POC OHS: ABNORMAL
CLARITY, UA POC OHS: CLEAR
COLOR, UA POC OHS: YELLOW
GLUCOSE, UA POC OHS: NEGATIVE
KETONES, UA POC OHS: NEGATIVE
LEUKOCYTES, UA POC OHS: NEGATIVE
NITRITE, UA POC OHS: NEGATIVE
PH, UA POC OHS: 7
PROTEIN, UA POC OHS: NEGATIVE
SPECIFIC GRAVITY, UA POC OHS: 1.01
UROBILINOGEN, UA POC OHS: 0.2

## 2023-09-06 PROCEDURE — 99999 PR PBB SHADOW E&M-EST. PATIENT-LVL III: ICD-10-PCS | Mod: PBBFAC,,, | Performed by: FAMILY MEDICINE

## 2023-09-06 PROCEDURE — 99999 PR PBB SHADOW E&M-EST. PATIENT-LVL III: CPT | Mod: PBBFAC,,, | Performed by: FAMILY MEDICINE

## 2023-09-06 PROCEDURE — 3078F DIAST BP <80 MM HG: CPT | Mod: S$GLB,,, | Performed by: FAMILY MEDICINE

## 2023-09-06 PROCEDURE — 3078F PR MOST RECENT DIASTOLIC BLOOD PRESSURE < 80 MM HG: ICD-10-PCS | Mod: S$GLB,,, | Performed by: FAMILY MEDICINE

## 2023-09-06 PROCEDURE — 81003 URINALYSIS AUTO W/O SCOPE: CPT | Mod: QW,S$GLB,, | Performed by: FAMILY MEDICINE

## 2023-09-06 PROCEDURE — 99215 PR OFFICE/OUTPT VISIT, EST, LEVL V, 40-54 MIN: ICD-10-PCS | Mod: S$GLB,,, | Performed by: FAMILY MEDICINE

## 2023-09-06 PROCEDURE — 1159F PR MEDICATION LIST DOCUMENTED IN MEDICAL RECORD: ICD-10-PCS | Mod: S$GLB,,, | Performed by: FAMILY MEDICINE

## 2023-09-06 PROCEDURE — 3074F PR MOST RECENT SYSTOLIC BLOOD PRESSURE < 130 MM HG: ICD-10-PCS | Mod: S$GLB,,, | Performed by: FAMILY MEDICINE

## 2023-09-06 PROCEDURE — 81003 POCT URINALYSIS(INSTRUMENT): ICD-10-PCS | Mod: QW,S$GLB,, | Performed by: FAMILY MEDICINE

## 2023-09-06 PROCEDURE — 99215 OFFICE O/P EST HI 40 MIN: CPT | Mod: S$GLB,,, | Performed by: FAMILY MEDICINE

## 2023-09-06 PROCEDURE — 1160F PR REVIEW ALL MEDS BY PRESCRIBER/CLIN PHARMACIST DOCUMENTED: ICD-10-PCS | Mod: S$GLB,,, | Performed by: FAMILY MEDICINE

## 2023-09-06 PROCEDURE — 1159F MED LIST DOCD IN RCRD: CPT | Mod: S$GLB,,, | Performed by: FAMILY MEDICINE

## 2023-09-06 PROCEDURE — 1160F RVW MEDS BY RX/DR IN RCRD: CPT | Mod: S$GLB,,, | Performed by: FAMILY MEDICINE

## 2023-09-06 PROCEDURE — 3074F SYST BP LT 130 MM HG: CPT | Mod: S$GLB,,, | Performed by: FAMILY MEDICINE

## 2023-09-06 RX ORDER — ONDANSETRON HYDROCHLORIDE 8 MG/1
8 TABLET, FILM COATED ORAL EVERY 6 HOURS PRN
Qty: 90 TABLET | Refills: 0 | Status: SHIPPED | OUTPATIENT
Start: 2023-09-06 | End: 2023-11-15 | Stop reason: SDUPTHER

## 2023-09-06 NOTE — PATIENT INSTRUCTIONS
Miralax twice daily, follow up with a warm beverage    Magnesium oxide 400mg up to twice daily    Small amount of coconut oil like half teaspoon daily with meal or smoothie or by itself.

## 2023-09-06 NOTE — PROGRESS NOTES
"  Subjective:       Patient ID: Gogo Muro is a 43 y.o. female.    Chief Complaint: Follow-up    Complains of stomach bloating, worse in the last couple of months. No belching    She has had a colonoscopy before and was told she had difficult anatomy--states the prep was horrible and she was told afterward that she was high risk for possible perforation due to "such a little body with normal length of colon."    Has daily BM in the morning, but takes Miralax around 5pm. Always feels bloated. Stomach feels full, and abdomen no longer is flat.    Has hx of pancreatitis and limits fat intake.    No family history of colon cancer.          2023     7:26 AM 2022     1:30 PM   Depression Patient Health Questionnaire   Over the last two weeks how often have you been bothered by little interest or pleasure in doing things Not at all Not at all   Over the last two weeks how often have you been bothered by feeling down, depressed or hopeless Not at all Not at all   PHQ-2 Total Score 0 0          No data to display                Review of Systems   All other systems reviewed and are negative.            Past Medical History:   Diagnosis Date    Hx of migraines     Pancreatitis     PONV (postoperative nausea and vomiting)      Past Surgical History:   Procedure Laterality Date     SECTION      x 3    EYE SURGERY      HYSTERECTOMY      2016     Family History   Problem Relation Age of Onset    Hypertension Mother     Deep vein thrombosis Father     Hypertension Maternal Grandmother     Deep vein thrombosis Maternal Grandmother     Cancer Maternal Grandfather         Lung    Stroke Brother     Breast cancer Neg Hx        Review of patient's allergies indicates:   Allergen Reactions    Sulfamethoxazole-trimethoprim     Cephalexin Rash    Ibuprofen Palpitations    Sulfa (sulfonamide antibiotics) Rash       Current Outpatient Medications:     albuterol (PROVENTIL HFA) 90 mcg/actuation inhaler, Inhale 2 puffs " into the lungs every 4 (four) hours as needed for Wheezing or Shortness of Breath. Rescue, Disp: 18 g, Rfl: 1    ALPRAZolam (XANAX) 0.5 MG tablet, Take 1 tablet (0.5 mg total) by mouth 3 (three) times daily as needed for Anxiety., Disp: 30 tablet, Rfl: 1    butalbital-acetaminophen-caffeine -40 mg (FIORICET, ESGIC) -40 mg per tablet, Take 1 tablet by mouth every 6 (six) hours as needed for Headaches., Disp: 30 tablet, Rfl: 5    cyclobenzaprine (FLEXERIL) 5 MG tablet, Take 5 mg by mouth 2 (two) times daily as needed., Disp: , Rfl:     diphenoxylate-atropine 2.5-0.025 mg (LOMOTIL) 2.5-0.025 mg per tablet, Take 1 tablet by mouth as needed., Disp: , Rfl:     doxycycline (VIBRA-TABS) 100 MG tablet, Take 1 tablet (100 mg total) by mouth 2 (two) times daily., Disp: 20 tablet, Rfl: 0    hyoscyamine (LEVSIN/SL) 0.125 mg Subl, Place 1 tablet (0.125 mg total) under the tongue every 4 (four) hours as needed (nausea or stomach cramping)., Disp: 30 tablet, Rfl: 1    neomycin-polymyxin-dexamethasone (MAXITROL) 3.5 mg/g-10,000 unit/g-0.1 % Oint, Place into the left eye 3 (three) times daily., Disp: 3.5 g, Rfl: 1    NURTEC 75 mg odt, Take 1 tablet (75 mg total) by mouth daily as needed for Migraine., Disp: 16 tablet, Rfl: 2    ondansetron (ZOFRAN) 8 MG tablet, Take 1 tablet (8 mg total) by mouth every 6 (six) hours as needed for Nausea., Disp: 90 tablet, Rfl: 0    ondansetron (ZOFRAN-ODT) 4 MG TbDL, Take 1 tablet (4 mg total) by mouth every 6 (six) hours as needed (nausea or vomiting)., Disp: 90 tablet, Rfl: 1    ondansetron (ZOFRAN-ODT) 4 MG TbDL, DISSOLVE 1 TABLET(4 MG) ON THE TONGUE EVERY 6 HOURS AS NEEDED FOR NAUSEA, Disp: 30 tablet, Rfl: 1    scopolamine (TRANSDERM-SCOP) 1.3-1.5 mg (1 mg over 3 days), Place 1 patch onto the skin every 72 hours., Disp: 4 patch, Rfl: 0      Objective:      /60   Pulse 70   SpO2 100%   Physical Exam  Vitals and nursing note reviewed.   Constitutional:       General: She is not  in acute distress.     Appearance: Normal appearance. She is well-developed. She is not ill-appearing, toxic-appearing or diaphoretic.   HENT:      Head: Normocephalic and atraumatic.      Right Ear: External ear normal.      Left Ear: External ear normal.      Nose: Nose normal.   Eyes:      General: No scleral icterus.        Right eye: No discharge.         Left eye: No discharge.      Conjunctiva/sclera: Conjunctivae normal.   Neck:      Thyroid: No thyromegaly.      Vascular: No JVD.      Trachea: No tracheal deviation.   Cardiovascular:      Rate and Rhythm: Normal rate and regular rhythm.      Heart sounds: Normal heart sounds. No murmur heard.  Pulmonary:      Effort: Pulmonary effort is normal. No respiratory distress.      Breath sounds: Normal breath sounds. No wheezing.   Abdominal:      General: Abdomen is flat. Bowel sounds are normal. There is no distension.      Palpations: Abdomen is soft. There is no mass.      Tenderness: There is abdominal tenderness (mildly TTP over LLQ). There is no right CVA tenderness, left CVA tenderness, guarding or rebound.   Musculoskeletal:      Cervical back: Neck supple.      Right lower leg: No edema.      Left lower leg: No edema.   Skin:     General: Skin is warm and dry.      Capillary Refill: Capillary refill takes less than 2 seconds.      Coloration: Skin is not jaundiced or pale.   Neurological:      General: No focal deficit present.      Mental Status: She is alert and oriented to person, place, and time. Mental status is at baseline.      Gait: Gait normal.   Psychiatric:         Mood and Affect: Mood normal.         Behavior: Behavior normal.         Thought Content: Thought content normal.         Judgment: Judgment normal.         Assessment:       1. Chronic idiopathic constipation    2. Urinary frequency    3. Upper abdominal pain        Plan:       Chronic idiopathic constipation    Urinary frequency  -     POCT Urinalysis(Instrument)    Upper  "abdominal pain  -     ondansetron (ZOFRAN) 8 MG tablet; Take 1 tablet (8 mg total) by mouth every 6 (six) hours as needed for Nausea.  Dispense: 90 tablet; Refill: 0    Trace blood in urine, lysed.  Increase water intake.  Zofran prn nausea.  Recommend increasing Miralax vs addition of Linzess vs trial of MOM vs trial of coconut oil added to morning coffee or smoothie.  Reviewed previous KUB which did show much stool throughout the colon--explained that given hysterectomy, the pelvic anatomy is different allowing colon to "intrude" into the space which likely prevents bladder from fully filling before she has to void, hence the frequent urination. However, if symptoms of bladder spasm, flank pain, dysuria, urgency, we can repeat the urinalysis and send out for culture.    Miralax twice daily, follow up with a warm beverage    Magnesium oxide 400mg up to twice daily    Small amount of coconut oil like half teaspoon daily with meal or smoothie or by itself.        Previous records in Epic were reviewed, including the last 3 months of encounters, imaging, laboratory, and pathology reports.    Strict return precautions reviewed and patient verbalized understanding. Risks, benefits, and alternatives to the plan were reviewed in detail and all questions answered to the patient's satisfaction. Patient agrees to return to clinic or ER if symptoms worsen. 40 minutes total were spent on today's visit, not limited to but including time based on counseling and coordination of care.    Patient instructed that best way to communicate with my office staff is for patient to get on the [facility] patient portal to expedite communication and communication issues that may occur.  Patient was given instructions on how to get on the portal.  I encouraged patient to obtain portal access as well.  Ultimately it is up to the patient to obtain access.  Patient voiced understanding.    This note was created using M*Modal voice recognition " software that occasionally may misinterpret phrases or words.    Follow up if symptoms worsen or fail to improve.

## 2023-10-04 DIAGNOSIS — G43.109 MIGRAINE WITH AURA AND WITHOUT STATUS MIGRAINOSUS, NOT INTRACTABLE: ICD-10-CM

## 2023-10-04 RX ORDER — BUTALBITAL, ACETAMINOPHEN AND CAFFEINE 50; 325; 40 MG/1; MG/1; MG/1
1 TABLET ORAL EVERY 6 HOURS PRN
Qty: 30 TABLET | Refills: 5 | Status: SHIPPED | OUTPATIENT
Start: 2023-10-04 | End: 2024-01-18 | Stop reason: SDUPTHER

## 2023-11-07 ENCOUNTER — OFFICE VISIT (OUTPATIENT)
Dept: FAMILY MEDICINE | Facility: CLINIC | Age: 43
End: 2023-11-07
Payer: COMMERCIAL

## 2023-11-07 VITALS
HEIGHT: 56 IN | TEMPERATURE: 98 F | BODY MASS INDEX: 19.54 KG/M2 | WEIGHT: 86.88 LBS | HEART RATE: 68 BPM | OXYGEN SATURATION: 97 % | DIASTOLIC BLOOD PRESSURE: 72 MMHG | SYSTOLIC BLOOD PRESSURE: 128 MMHG

## 2023-11-07 DIAGNOSIS — B02.29 POSTHERPETIC NEURALGIA: ICD-10-CM

## 2023-11-07 DIAGNOSIS — B02.8 HERPES ZOSTER WITH COMPLICATION: ICD-10-CM

## 2023-11-07 DIAGNOSIS — G43.711 INTRACTABLE CHRONIC MIGRAINE WITHOUT AURA AND WITH STATUS MIGRAINOSUS: Primary | ICD-10-CM

## 2023-11-07 DIAGNOSIS — F41.8 SITUATIONAL ANXIETY: ICD-10-CM

## 2023-11-07 DIAGNOSIS — M54.12 CERVICAL RADICULOPATHY: ICD-10-CM

## 2023-11-07 PROCEDURE — 99214 OFFICE O/P EST MOD 30 MIN: CPT | Mod: S$GLB,,, | Performed by: FAMILY MEDICINE

## 2023-11-07 PROCEDURE — 1160F RVW MEDS BY RX/DR IN RCRD: CPT | Mod: S$GLB,,, | Performed by: FAMILY MEDICINE

## 2023-11-07 PROCEDURE — 1160F PR REVIEW ALL MEDS BY PRESCRIBER/CLIN PHARMACIST DOCUMENTED: ICD-10-PCS | Mod: S$GLB,,, | Performed by: FAMILY MEDICINE

## 2023-11-07 PROCEDURE — 99999 PR PBB SHADOW E&M-EST. PATIENT-LVL III: ICD-10-PCS | Mod: PBBFAC,,, | Performed by: FAMILY MEDICINE

## 2023-11-07 PROCEDURE — 3008F BODY MASS INDEX DOCD: CPT | Mod: S$GLB,,, | Performed by: FAMILY MEDICINE

## 2023-11-07 PROCEDURE — 99214 PR OFFICE/OUTPT VISIT, EST, LEVL IV, 30-39 MIN: ICD-10-PCS | Mod: S$GLB,,, | Performed by: FAMILY MEDICINE

## 2023-11-07 PROCEDURE — 3074F PR MOST RECENT SYSTOLIC BLOOD PRESSURE < 130 MM HG: ICD-10-PCS | Mod: S$GLB,,, | Performed by: FAMILY MEDICINE

## 2023-11-07 PROCEDURE — 99999 PR PBB SHADOW E&M-EST. PATIENT-LVL III: CPT | Mod: PBBFAC,,, | Performed by: FAMILY MEDICINE

## 2023-11-07 PROCEDURE — 3074F SYST BP LT 130 MM HG: CPT | Mod: S$GLB,,, | Performed by: FAMILY MEDICINE

## 2023-11-07 PROCEDURE — 1159F PR MEDICATION LIST DOCUMENTED IN MEDICAL RECORD: ICD-10-PCS | Mod: S$GLB,,, | Performed by: FAMILY MEDICINE

## 2023-11-07 PROCEDURE — 3078F PR MOST RECENT DIASTOLIC BLOOD PRESSURE < 80 MM HG: ICD-10-PCS | Mod: S$GLB,,, | Performed by: FAMILY MEDICINE

## 2023-11-07 PROCEDURE — 3008F PR BODY MASS INDEX (BMI) DOCUMENTED: ICD-10-PCS | Mod: S$GLB,,, | Performed by: FAMILY MEDICINE

## 2023-11-07 PROCEDURE — 1159F MED LIST DOCD IN RCRD: CPT | Mod: S$GLB,,, | Performed by: FAMILY MEDICINE

## 2023-11-07 PROCEDURE — 3078F DIAST BP <80 MM HG: CPT | Mod: S$GLB,,, | Performed by: FAMILY MEDICINE

## 2023-11-07 NOTE — PROGRESS NOTES
Subjective:       Patient ID: Gogo Muro is a 43 y.o. female.    Chief Complaint: Follow-up (Migraines x 5 days, Constant. Getting migraines more often)    Shingles outbreak last week (pain began on Tuesday but rash on Friday.)    Has not had a shingles outbreak since her first covid vaccine. However, always occurs R lateral lower leg distribution. Still with a single blister.    Constant migraine since Thursday of last week. States she had a drink with artificial sweetener and had a HA after, but Friday morning had full blown migraine.  Associated nausea and diarrhea yesterday afternoon.    Thursday took Tylenol 500mg.  Friday took Fioricet.  Has not taken the Nurtec (helped once and helped tremendously), but has not taken triptan ever.  Cannot tolerate IBU (palpitations.) Has never taken Toradol.    She cannot even drink fluids without having diarrhea. No green, watery, or extremely foul smelling stools.          2023     7:26 AM 2022     1:30 PM   Depression Patient Health Questionnaire   Over the last two weeks how often have you been bothered by little interest or pleasure in doing things Not at all Not at all   Over the last two weeks how often have you been bothered by feeling down, depressed or hopeless Not at all Not at all   PHQ-2 Total Score 0 0          No data to display                Review of Systems   All other systems reviewed and are negative.        Past Medical History:   Diagnosis Date    Hx of migraines     Pancreatitis     PONV (postoperative nausea and vomiting)      Past Surgical History:   Procedure Laterality Date     SECTION      x 3    EYE SURGERY      HYSTERECTOMY      2016     Family History   Problem Relation Age of Onset    Hypertension Mother     Deep vein thrombosis Father     Hypertension Maternal Grandmother     Deep vein thrombosis Maternal Grandmother     Cancer Maternal Grandfather         Lung    Stroke Brother     Breast cancer Neg Hx        Review  of patient's allergies indicates:   Allergen Reactions    Sulfamethoxazole-trimethoprim     Cephalexin Rash    Ibuprofen Palpitations    Sulfa (sulfonamide antibiotics) Rash       Current Outpatient Medications:     albuterol (PROVENTIL HFA) 90 mcg/actuation inhaler, Inhale 2 puffs into the lungs every 4 (four) hours as needed for Wheezing or Shortness of Breath. Rescue, Disp: 18 g, Rfl: 1    ALPRAZolam (XANAX) 0.5 MG tablet, Take 1 tablet (0.5 mg total) by mouth 3 (three) times daily as needed for Anxiety., Disp: 30 tablet, Rfl: 2    butalbital-acetaminophen-caffeine -40 mg (FIORICET, ESGIC) -40 mg per tablet, Take 1 tablet by mouth every 6 (six) hours as needed for Headaches., Disp: 30 tablet, Rfl: 5    cyclobenzaprine (FLEXERIL) 5 MG tablet, Take 5 mg by mouth 2 (two) times daily as needed., Disp: , Rfl:     diphenoxylate-atropine 2.5-0.025 mg (LOMOTIL) 2.5-0.025 mg per tablet, Take 1 tablet by mouth as needed., Disp: , Rfl:     doxycycline (VIBRA-TABS) 100 MG tablet, Take 1 tablet (100 mg total) by mouth 2 (two) times daily., Disp: 20 tablet, Rfl: 0    hyoscyamine (LEVSIN/SL) 0.125 mg Subl, Place 1 tablet (0.125 mg total) under the tongue every 4 (four) hours as needed (nausea or stomach cramping)., Disp: 30 tablet, Rfl: 1    neomycin-polymyxin-dexamethasone (MAXITROL) 3.5 mg/g-10,000 unit/g-0.1 % Oint, Place into the left eye 3 (three) times daily., Disp: 3.5 g, Rfl: 1    NURTEC 75 mg odt, Take 1 tablet (75 mg total) by mouth daily as needed for Migraine., Disp: 16 tablet, Rfl: 2    ondansetron (ZOFRAN) 8 MG tablet, Take 1 tablet (8 mg total) by mouth every 6 (six) hours as needed for Nausea., Disp: 90 tablet, Rfl: 0    ondansetron (ZOFRAN-ODT) 4 MG TbDL, Take 1 tablet (4 mg total) by mouth every 6 (six) hours as needed (nausea or vomiting)., Disp: 90 tablet, Rfl: 1    ondansetron (ZOFRAN-ODT) 4 MG TbDL, DISSOLVE 1 TABLET(4 MG) ON THE TONGUE EVERY 6 HOURS AS NEEDED FOR NAUSEA, Disp: 30 tablet, Rfl:  "1    scopolamine (TRANSDERM-SCOP) 1.3-1.5 mg (1 mg over 3 days), Place 1 patch onto the skin every 72 hours., Disp: 4 patch, Rfl: 0      Objective:      /72 (BP Location: Left arm, Patient Position: Sitting, BP Method: Medium (Manual))   Pulse 68   Temp 98.2 °F (36.8 °C) (Oral)   Ht 4' 8" (1.422 m)   Wt 39.4 kg (86 lb 13.8 oz)   SpO2 97%   BMI 19.47 kg/m²   Physical Exam  Vitals and nursing note reviewed.   Constitutional:       Appearance: She is ill-appearing (obvious that patient does not feel well). She is not toxic-appearing or diaphoretic.   HENT:      Head: Normocephalic and atraumatic.      Mouth/Throat:      Mouth: Mucous membranes are moist.      Pharynx: Oropharynx is clear.   Eyes:      General: No scleral icterus.        Right eye: No discharge.         Left eye: No discharge.      Extraocular Movements: Extraocular movements intact.      Conjunctiva/sclera: Conjunctivae normal.      Pupils: Pupils are equal, round, and reactive to light.   Neck:      Vascular: No carotid bruit.   Cardiovascular:      Rate and Rhythm: Normal rate and regular rhythm.      Pulses: Normal pulses.      Heart sounds: Normal heart sounds. No murmur heard.  Abdominal:      General: There is no distension.      Tenderness: There is no right CVA tenderness or left CVA tenderness.   Musculoskeletal:      Cervical back: Neck supple. Tenderness (to palpation posterior neck muscles) present. No rigidity.   Lymphadenopathy:      Cervical: No cervical adenopathy.   Skin:     Capillary Refill: Capillary refill takes less than 2 seconds.      Findings: Rash (R lower leg) present.   Neurological:      General: No focal deficit present.      Mental Status: She is alert and oriented to person, place, and time. Mental status is at baseline.      Cranial Nerves: No cranial nerve deficit.      Motor: No weakness.   Psychiatric:         Behavior: Behavior normal.         Thought Content: Thought content normal.         Judgment: " Judgment normal.         Assessment:       1. Intractable chronic migraine without aura and with status migrainosus    2. Cervical radiculopathy    3. Situational anxiety    4. Postherpetic neuralgia    5. Herpes zoster with complication        Plan:       Intractable chronic migraine without aura and with status migrainosus    Cervical radiculopathy    Situational anxiety    Postherpetic neuralgia    Herpes zoster with complication          Has meds at home.  Desires to hold off on steroid injection and definitely avoid NSAID injection   Start Nurtec every other day as preventive  Cannot give NSAIDs due to palpitations; can take Tylenol if needed but monitor for max level  She has never taken a narcotic pain medication even after surgery        Previous records in Epic were reviewed, including the last 3 months of encounters, imaging, laboratory, and pathology reports.    Strict return precautions reviewed and patient verbalized understanding. Risks, benefits, and alternatives to the plan were reviewed in detail and all questions answered to the patient's satisfaction. Patient agrees to return to clinic or ER if symptoms worsen. 30 minutes total were spent on today's visit, not limited to but including time based on counseling and coordination of care.    Patient instructed that best way to communicate with my office staff is for patient to get on the [facility] patient portal to expedite communication and communication issues that may occur.  Patient was given instructions on how to get on the portal.  I encouraged patient to obtain portal access as well.  Ultimately it is up to the patient to obtain access.  Patient voiced understanding.    This note was created using Grade[employer] voice recognition software that occasionally may misinterpret phrases or words.    Follow up if symptoms worsen or fail to improve.

## 2023-11-10 ENCOUNTER — PATIENT MESSAGE (OUTPATIENT)
Dept: FAMILY MEDICINE | Facility: CLINIC | Age: 43
End: 2023-11-10
Payer: COMMERCIAL

## 2023-11-10 DIAGNOSIS — F41.9 ANXIETY: ICD-10-CM

## 2023-11-10 RX ORDER — ALPRAZOLAM 0.5 MG/1
0.5 TABLET ORAL 3 TIMES DAILY PRN
Qty: 30 TABLET | Refills: 2 | Status: SHIPPED | OUTPATIENT
Start: 2023-11-10 | End: 2024-02-06

## 2023-11-10 NOTE — TELEPHONE ENCOUNTER
No care due was identified.  [facility] Embedded Care Due Messages. Reference number: [id number].   11/10/2023 8:21:23 AM CST

## 2023-11-15 DIAGNOSIS — R10.10 UPPER ABDOMINAL PAIN: ICD-10-CM

## 2023-11-15 RX ORDER — ONDANSETRON HYDROCHLORIDE 8 MG/1
8 TABLET, FILM COATED ORAL EVERY 6 HOURS PRN
Qty: 90 TABLET | Refills: 0 | Status: SHIPPED | OUTPATIENT
Start: 2023-11-15 | End: 2023-12-17

## 2023-11-15 NOTE — TELEPHONE ENCOUNTER
No care due was identified.  [facility] Embedded Care Due Messages. Reference number: [id number].   11/15/2023 7:14:20 AM CST

## 2023-11-19 PROBLEM — B02.29 POSTHERPETIC NEURALGIA: Status: ACTIVE | Noted: 2023-11-19

## 2023-12-17 ENCOUNTER — OFFICE VISIT (OUTPATIENT)
Dept: URGENT CARE | Facility: CLINIC | Age: 43
End: 2023-12-17
Payer: COMMERCIAL

## 2023-12-17 VITALS
HEART RATE: 87 BPM | OXYGEN SATURATION: 98 % | RESPIRATION RATE: 16 BRPM | DIASTOLIC BLOOD PRESSURE: 71 MMHG | TEMPERATURE: 99 F | SYSTOLIC BLOOD PRESSURE: 110 MMHG | HEIGHT: 56 IN | WEIGHT: 86 LBS | BODY MASS INDEX: 19.35 KG/M2

## 2023-12-17 DIAGNOSIS — B96.89 BACTERIAL UPPER RESPIRATORY INFECTION: Primary | ICD-10-CM

## 2023-12-17 DIAGNOSIS — R05.9 COUGH, UNSPECIFIED TYPE: ICD-10-CM

## 2023-12-17 DIAGNOSIS — J02.9 SORE THROAT: ICD-10-CM

## 2023-12-17 DIAGNOSIS — J06.9 BACTERIAL UPPER RESPIRATORY INFECTION: Primary | ICD-10-CM

## 2023-12-17 LAB
CTP QC/QA: YES
MOLECULAR STREP A: NEGATIVE
POC MOLECULAR INFLUENZA A AGN: NEGATIVE
POC MOLECULAR INFLUENZA B AGN: NEGATIVE
SARS-COV-2 AG RESP QL IA.RAPID: NEGATIVE

## 2023-12-17 PROCEDURE — 87811 SARS CORONAVIRUS 2 ANTIGEN POCT, MANUAL READ: ICD-10-PCS | Mod: QW,S$GLB,,

## 2023-12-17 PROCEDURE — 87651 POCT STREP A MOLECULAR: ICD-10-PCS | Mod: QW,,,

## 2023-12-17 PROCEDURE — 87502 POCT INFLUENZA A/B MOLECULAR: ICD-10-PCS | Mod: QW,,,

## 2023-12-17 PROCEDURE — 87811 SARS-COV-2 COVID19 W/OPTIC: CPT | Mod: QW,S$GLB,,

## 2023-12-17 PROCEDURE — 87651 STREP A DNA AMP PROBE: CPT | Mod: QW,,,

## 2023-12-17 PROCEDURE — 99204 OFFICE O/P NEW MOD 45 MIN: CPT | Mod: S$GLB,,,

## 2023-12-17 PROCEDURE — 99204 PR OFFICE/OUTPT VISIT, NEW, LEVL IV, 45-59 MIN: ICD-10-PCS | Mod: S$GLB,,,

## 2023-12-17 PROCEDURE — 87502 INFLUENZA DNA AMP PROBE: CPT | Mod: QW,,,

## 2023-12-17 RX ORDER — PROMETHAZINE HYDROCHLORIDE AND DEXTROMETHORPHAN HYDROBROMIDE 6.25; 15 MG/5ML; MG/5ML
5 SYRUP ORAL EVERY 8 HOURS PRN
Qty: 118 ML | Refills: 0 | Status: SHIPPED | OUTPATIENT
Start: 2023-12-17 | End: 2024-02-06 | Stop reason: ALTCHOICE

## 2023-12-17 RX ORDER — METHYLPREDNISOLONE 4 MG/1
TABLET ORAL
Qty: 21 EACH | Refills: 0 | Status: SHIPPED | OUTPATIENT
Start: 2023-12-17 | End: 2024-01-07

## 2023-12-17 RX ORDER — AMOXICILLIN 500 MG/1
500 CAPSULE ORAL 3 TIMES DAILY
Qty: 30 CAPSULE | Refills: 0 | Status: SHIPPED | OUTPATIENT
Start: 2023-12-17 | End: 2023-12-27

## 2023-12-17 NOTE — LETTER
December 17, 2023      [place] [facility]  [facility] [address] [employer], [address]  [place] [address]  Phone: [phone]  Fax: [phone]       Patient: Gogo Muro   YOB: 1980  Date of Visit: 12/17/2023    To Whom It May Concern:    Fernie Muro  was at [facility] on 12/17/2023. The patient may return to work/school on 12/19/2023 with no restrictions. If you have any questions or concerns, or if I can be of further assistance, please do not hesitate to contact me.    Sincerely,    Tarsha Gibson, NP

## 2023-12-17 NOTE — PROGRESS NOTES
"Subjective:      Patient ID: Gogo Muro is a 43 y.o. female.    Vitals:  height is 4' 8" (1.422 m) and weight is 39 kg (86 lb). Her oral temperature is 98.5 °F (36.9 °C). Her blood pressure is 110/71 and her pulse is 87. Her respiration is 16 and oxygen saturation is 98%.     Chief Complaint: Cough    Pt is coming in with cough that started about two to three boyle ago. Pt says he cough got worse over the pass day. Pt says she also has body aches and sore throat. Pt says her cough is worse at night and its very dry. Pt says she took OTC cough medication with no relief.     Cough  This is a new problem. The current episode started in the past 7 days. The problem has been gradually worsening. The problem occurs every few minutes. The cough is Non-productive. Associated symptoms include myalgias and a sore throat. Pertinent negatives include no fever, headaches or nasal congestion. Associated symptoms comments: Body aches . Nothing aggravates the symptoms. She has tried OTC cough suppressant for the symptoms. The treatment provided no relief. There is no history of asthma, bronchitis or pneumonia.       Constitution: Negative. Negative for fever.   HENT:  Positive for sore throat.    Neck: neck negative.   Cardiovascular: Negative.    Eyes: Negative.    Respiratory:  Positive for cough.    Gastrointestinal: Negative.    Endocrine: negative.   Genitourinary: Negative.    Musculoskeletal:  Positive for muscle ache.   Skin: Negative.    Allergic/Immunologic: Negative.    Neurological: Negative.  Negative for headaches.   Hematologic/Lymphatic: Negative.    Psychiatric/Behavioral: Negative.        Objective:     Physical Exam   Constitutional: She is oriented to person, place, and time.   HENT:   Head: Normocephalic and atraumatic.   Ears:   Right Ear: Tympanic membrane, external ear and ear canal normal.   Left Ear: Tympanic membrane, external ear and ear canal normal.   Nose: Congestion present.   Mouth/Throat: " Oropharyngeal exudate and posterior oropharyngeal erythema present.   Neck: Neck supple.   Cardiovascular: Normal rate, regular rhythm, normal heart sounds and normal pulses.   Pulmonary/Chest: Effort normal and breath sounds normal.   Abdominal: Normal appearance.   Musculoskeletal: Normal range of motion.         General: Normal range of motion.   Neurological: no focal deficit. She is alert and oriented to person, place, and time.   Skin: Skin is warm and dry.   Psychiatric: Her behavior is normal. Mood, judgment and thought content normal.   Nursing note and vitals reviewed.      Assessment:     1. Bacterial upper respiratory infection    2. Cough, unspecified type    3. Sore throat      Results for orders placed or performed in visit on 12/17/23   SARS Coronavirus 2 Antigen, POCT Manual Read   Result Value Ref Range    SARS Coronavirus 2 Antigen Negative Negative     Acceptable Yes    POCT Influenza A/B MOLECULAR   Result Value Ref Range    POC Molecular Influenza A Ag Negative Negative, Not Reported    POC Molecular Influenza B Ag Negative Negative, Not Reported     Acceptable Yes    POCT Strep A, Molecular   Result Value Ref Range    Molecular Strep A, POC Negative Negative     Acceptable Yes       Plan:       Bacterial upper respiratory infection  -     amoxicillin (AMOXIL) 500 MG capsule; Take 1 capsule (500 mg total) by mouth 3 (three) times daily. for 10 days  Dispense: 30 capsule; Refill: 0    Cough, unspecified type  -     SARS Coronavirus 2 Antigen, POCT Manual Read  -     POCT Influenza A/B MOLECULAR  -     POCT Strep A, Molecular  -     promethazine-dextromethorphan (PROMETHAZINE-DM) 6.25-15 mg/5 mL Syrp; Take 5 mLs by mouth every 8 (eight) hours as needed (cough).  Dispense: 118 mL; Refill: 0  -     methylPREDNISolone (MEDROL DOSEPACK) 4 mg tablet; use as directed  Dispense: 21 each; Refill: 0  -     amoxicillin (AMOXIL) 500 MG capsule; Take 1 capsule  (500 mg total) by mouth 3 (three) times daily. for 10 days  Dispense: 30 capsule; Refill: 0    Sore throat  -     POCT Strep A, Molecular  -     promethazine-dextromethorphan (PROMETHAZINE-DM) 6.25-15 mg/5 mL Syrp; Take 5 mLs by mouth every 8 (eight) hours as needed (cough).  Dispense: 118 mL; Refill: 0  -     methylPREDNISolone (MEDROL DOSEPACK) 4 mg tablet; use as directed  Dispense: 21 each; Refill: 0  -     amoxicillin (AMOXIL) 500 MG capsule; Take 1 capsule (500 mg total) by mouth 3 (three) times daily. for 10 days  Dispense: 30 capsule; Refill: 0

## 2023-12-20 ENCOUNTER — TELEPHONE (OUTPATIENT)
Dept: URGENT CARE | Facility: CLINIC | Age: 43
End: 2023-12-20
Payer: COMMERCIAL

## 2024-01-18 DIAGNOSIS — G43.109 MIGRAINE WITH AURA AND WITHOUT STATUS MIGRAINOSUS, NOT INTRACTABLE: ICD-10-CM

## 2024-01-18 NOTE — TELEPHONE ENCOUNTER
No care due was identified.  [facility] Embedded Care Due Messages. Reference number: [id number].   1/18/2024 7:06:34 AM CST

## 2024-01-19 RX ORDER — BUTALBITAL, ACETAMINOPHEN AND CAFFEINE 50; 325; 40 MG/1; MG/1; MG/1
1 TABLET ORAL EVERY 6 HOURS PRN
Qty: 30 TABLET | Refills: 0 | Status: SHIPPED | OUTPATIENT
Start: 2024-01-19 | End: 2024-02-06 | Stop reason: SDUPTHER

## 2024-01-19 NOTE — TELEPHONE ENCOUNTER
Refill Routing Note   Medication(s) are not appropriate for processing by [facility] for the following reason(s):        Outside of protocol    [facility] action(s):  Route               Appointments  past 12m or future 3m with PCP    Date Provider   Last Visit   11/7/2023 Camila Atkins MD   Next Visit   Visit date not found Camila Atkins MD   ED visits in past 90 days: 0        Note composed:8:58 AM 01/19/2024

## 2024-02-06 ENCOUNTER — OFFICE VISIT (OUTPATIENT)
Dept: FAMILY MEDICINE | Facility: CLINIC | Age: 44
End: 2024-02-06
Payer: COMMERCIAL

## 2024-02-06 VITALS
SYSTOLIC BLOOD PRESSURE: 90 MMHG | WEIGHT: 85.13 LBS | HEART RATE: 64 BPM | TEMPERATURE: 98 F | OXYGEN SATURATION: 100 % | HEIGHT: 56 IN | DIASTOLIC BLOOD PRESSURE: 62 MMHG | RESPIRATION RATE: 12 BRPM | BODY MASS INDEX: 19.15 KG/M2

## 2024-02-06 DIAGNOSIS — R11.0 NAUSEA: ICD-10-CM

## 2024-02-06 DIAGNOSIS — G43.109 MIGRAINE WITH AURA AND WITHOUT STATUS MIGRAINOSUS, NOT INTRACTABLE: ICD-10-CM

## 2024-02-06 DIAGNOSIS — B02.8 HERPES ZOSTER WITH OTHER COMPLICATION: Primary | ICD-10-CM

## 2024-02-06 DIAGNOSIS — B00.9 HERPETIC LESION: ICD-10-CM

## 2024-02-06 PROCEDURE — 3074F SYST BP LT 130 MM HG: CPT | Mod: S$GLB,,, | Performed by: FAMILY MEDICINE

## 2024-02-06 PROCEDURE — 99214 OFFICE O/P EST MOD 30 MIN: CPT | Mod: S$GLB,,, | Performed by: FAMILY MEDICINE

## 2024-02-06 PROCEDURE — 1160F RVW MEDS BY RX/DR IN RCRD: CPT | Mod: S$GLB,,, | Performed by: FAMILY MEDICINE

## 2024-02-06 PROCEDURE — 99999 PR PBB SHADOW E&M-EST. PATIENT-LVL IV: CPT | Mod: PBBFAC,,, | Performed by: FAMILY MEDICINE

## 2024-02-06 PROCEDURE — 3008F BODY MASS INDEX DOCD: CPT | Mod: S$GLB,,, | Performed by: FAMILY MEDICINE

## 2024-02-06 PROCEDURE — 1159F MED LIST DOCD IN RCRD: CPT | Mod: S$GLB,,, | Performed by: FAMILY MEDICINE

## 2024-02-06 PROCEDURE — 3078F DIAST BP <80 MM HG: CPT | Mod: S$GLB,,, | Performed by: FAMILY MEDICINE

## 2024-02-06 RX ORDER — ONDANSETRON 4 MG/1
4 TABLET, ORALLY DISINTEGRATING ORAL EVERY 6 HOURS PRN
Qty: 90 TABLET | Refills: 0 | Status: SHIPPED | OUTPATIENT
Start: 2024-02-06 | End: 2024-05-20 | Stop reason: SDUPTHER

## 2024-02-06 RX ORDER — IVERMECTIN 10 MG/G
1 CREAM TOPICAL DAILY
COMMUNITY
Start: 2023-12-28

## 2024-02-06 RX ORDER — BUTALBITAL, ACETAMINOPHEN AND CAFFEINE 50; 325; 40 MG/1; MG/1; MG/1
1 TABLET ORAL EVERY 6 HOURS PRN
Qty: 30 TABLET | Refills: 2 | Status: SHIPPED | OUTPATIENT
Start: 2024-02-06 | End: 2024-05-20 | Stop reason: SDUPTHER

## 2024-02-06 RX ORDER — MUPIROCIN 20 MG/G
OINTMENT TOPICAL 3 TIMES DAILY
Qty: 15 G | Refills: 1 | Status: SHIPPED | OUTPATIENT
Start: 2024-02-06

## 2024-02-06 RX ORDER — GABAPENTIN 100 MG/1
100 CAPSULE ORAL 3 TIMES DAILY PRN
Qty: 90 CAPSULE | Refills: 0 | Status: SHIPPED | OUTPATIENT
Start: 2024-02-06 | End: 2024-03-04

## 2024-02-06 RX ORDER — VALACYCLOVIR HYDROCHLORIDE 500 MG/1
1000 TABLET, FILM COATED ORAL 2 TIMES DAILY
Qty: 120 TABLET | Refills: 0 | Status: SHIPPED | OUTPATIENT
Start: 2024-02-06 | End: 2025-02-05

## 2024-02-06 NOTE — PROGRESS NOTES
Subjective:       Patient ID: Gogo Muro is a 43 y.o. female.    Chief Complaint: Herpes Zoster (Left leg)    Shingles outbreak. Skin has not healed in over a week.  Happens during stress.  Also with Migraine. Has taken Nurtec, Fioricet, and Tylenol for the HA. States not a severe migraine, but it is associated with nause and belly pain overall feeling unwell.          2024     8:12 AM 2023     7:26 AM 2022     1:30 PM   Depression Patient Health Questionnaire   Over the last two weeks how often have you been bothered by little interest or pleasure in doing things Not at all Not at all Not at all   Over the last two weeks how often have you been bothered by feeling down, depressed or hopeless Not at all Not at all Not at all   PHQ-2 Total Score 0 0 0          No data to display                Review of Systems   All other systems reviewed and are negative.        Past Medical History:   Diagnosis Date    Hx of migraines     Pancreatitis     PONV (postoperative nausea and vomiting)      Past Surgical History:   Procedure Laterality Date     SECTION      x 3    EYE SURGERY      HYSTERECTOMY      2016     Family History   Problem Relation Age of Onset    Hypertension Mother     Deep vein thrombosis Father     Hypertension Maternal Grandmother     Deep vein thrombosis Maternal Grandmother     Cancer Maternal Grandfather         Lung    Stroke Brother     Breast cancer Neg Hx        Review of patient's allergies indicates:   Allergen Reactions    Sulfamethoxazole-trimethoprim     Cephalexin Rash    Ibuprofen Palpitations    Sulfa (sulfonamide antibiotics) Rash       Current Outpatient Medications:     ALPRAZolam (XANAX) 0.5 MG tablet, Take 1 tablet (0.5 mg total) by mouth 3 (three) times daily as needed for Anxiety., Disp: 30 tablet, Rfl: 2    ivermectin (SOOLANTRA) 1 % Crea, Apply 1 Units topically once daily., Disp: , Rfl:     scopolamine (TRANSDERM-SCOP) 1.3-1.5 mg (1 mg over 3 days),  "Place 1 patch onto the skin every 72 hours., Disp: 4 patch, Rfl: 0    butalbital-acetaminophen-caffeine -40 mg (FIORICET, ESGIC) -40 mg per tablet, Take 1 tablet by mouth every 6 (six) hours as needed for Headaches., Disp: 30 tablet, Rfl: 2    cyclobenzaprine (FLEXERIL) 5 MG tablet, Take 5 mg by mouth 2 (two) times daily as needed., Disp: , Rfl:     diphenoxylate-atropine 2.5-0.025 mg (LOMOTIL) 2.5-0.025 mg per tablet, Take 1 tablet by mouth as needed., Disp: , Rfl:     gabapentin (NEURONTIN) 100 MG capsule, Take 1 capsule (100 mg total) by mouth 3 (three) times daily as needed (nerve pain)., Disp: 90 capsule, Rfl: 0    mupirocin (BACTROBAN) 2 % ointment, Apply topically 3 (three) times daily., Disp: 15 g, Rfl: 1    NURTEC 75 mg odt, Take 1 tablet (75 mg total) by mouth daily as needed for Migraine., Disp: 16 tablet, Rfl: 2    ondansetron (ZOFRAN-ODT) 4 MG TbDL, Take 1 tablet (4 mg total) by mouth every 6 (six) hours as needed (nausea)., Disp: 90 tablet, Rfl: 0    valACYclovir (VALTREX) 500 MG tablet, Take 2 tablets (1,000 mg total) by mouth 2 (two) times daily., Disp: 120 tablet, Rfl: 0      Objective:      BP 90/62 (BP Location: Left arm, Patient Position: Sitting, BP Method: Small (Manual))   Pulse 64   Temp 98 °F (36.7 °C) (Oral)   Resp 12   Ht 4' 7.98" (1.422 m)   Wt 38.6 kg (85 lb 1.6 oz)   SpO2 100%   BMI 19.09 kg/m²   Physical Exam  Vitals and nursing note reviewed.   HENT:      Head: Normocephalic and atraumatic.   Eyes:      General: No scleral icterus.        Right eye: No discharge.         Left eye: No discharge.      Conjunctiva/sclera: Conjunctivae normal.   Cardiovascular:      Rate and Rhythm: Normal rate and regular rhythm.   Pulmonary:      Effort: Pulmonary effort is normal. No respiratory distress.      Breath sounds: Normal breath sounds.   Musculoskeletal:      Cervical back: Neck supple. No tenderness.   Lymphadenopathy:      Cervical: No cervical adenopathy.   Skin:     " General: Skin is warm and dry.      Capillary Refill: Capillary refill takes less than 2 seconds.      Coloration: Skin is not jaundiced.      Findings: Rash (R lower leg) present.   Neurological:      General: No focal deficit present.      Mental Status: She is alert and oriented to person, place, and time. Mental status is at baseline.   Psychiatric:         Behavior: Behavior normal.         Judgment: Judgment normal.         Assessment:       1. Herpes zoster with other complication    2. Herpetic lesion    3. Nausea    4. Migraine with aura and without status migrainosus, not intractable        Plan:       Herpes zoster with other complication  -     valACYclovir (VALTREX) 500 MG tablet; Take 2 tablets (1,000 mg total) by mouth 2 (two) times daily.  Dispense: 120 tablet; Refill: 0  -     gabapentin (NEURONTIN) 100 MG capsule; Take 1 capsule (100 mg total) by mouth 3 (three) times daily as needed (nerve pain).  Dispense: 90 capsule; Refill: 0    Herpetic lesion  -     mupirocin (BACTROBAN) 2 % ointment; Apply topically 3 (three) times daily.  Dispense: 15 g; Refill: 1  -     valACYclovir (VALTREX) 500 MG tablet; Take 2 tablets (1,000 mg total) by mouth 2 (two) times daily.  Dispense: 120 tablet; Refill: 0    Nausea  -     ondansetron (ZOFRAN-ODT) 4 MG TbDL; Take 1 tablet (4 mg total) by mouth every 6 (six) hours as needed (nausea).  Dispense: 90 tablet; Refill: 0    Migraine with aura and without status migrainosus, not intractable  -     butalbital-acetaminophen-caffeine -40 mg (FIORICET, ESGIC) -40 mg per tablet; Take 1 tablet by mouth every 6 (six) hours as needed for Headaches.  Dispense: 30 tablet; Refill: 2    Valtrex--discussed how to use at first onset of shingles outbreak.  Take 1 tab BID x 10 days for current outbreak.  Neurontin prn nerve pain.  D/c Neosporin.  Trial of Fioricet for migraine.          Previous records in Epic were reviewed, including the last 3 months of encounters,  imaging, laboratory, and pathology reports.    Strict return precautions reviewed and patient verbalized understanding. Risks, benefits, and alternatives to the plan were reviewed in detail and all questions answered to the patient's satisfaction. Patient agrees to return to clinic or ER if symptoms worsen. 30 minutes total were spent on today's visit, not limited to but including time based on counseling and coordination of care.    Patient instructed that best way to communicate with my office staff is for patient to get on the [facility] epic patient portal to expedite communication and communication issues that may occur.  Patient was given instructions on how to get on the portal.  I encouraged patient to obtain portal access as well.  Ultimately it is up to the patient to obtain access.  Patient voiced understanding.    This note was created using M*Modal voice recognition software that occasionally may misinterpret phrases or words.    Follow up if symptoms worsen or fail to improve.

## 2024-02-09 ENCOUNTER — PATIENT MESSAGE (OUTPATIENT)
Dept: FAMILY MEDICINE | Facility: CLINIC | Age: 44
End: 2024-02-09
Payer: COMMERCIAL

## 2024-02-28 DIAGNOSIS — Z12.31 OTHER SCREENING MAMMOGRAM: ICD-10-CM

## 2024-03-04 DIAGNOSIS — B02.8 HERPES ZOSTER WITH OTHER COMPLICATION: ICD-10-CM

## 2024-03-04 RX ORDER — GABAPENTIN 100 MG/1
CAPSULE ORAL
Qty: 90 CAPSULE | Refills: 2 | Status: SHIPPED | OUTPATIENT
Start: 2024-03-04

## 2024-03-04 NOTE — TELEPHONE ENCOUNTER
Care Due:                  Date            Visit Type   Department     Provider  --------------------------------------------------------------------------------                                MYCHART                              FOLLOWUP/OF  [facility]  Last Visit: 02-      [facility] VISIT   MEDICINE       Camila Atkins                              MYCHART                              ANNUAL                              CHECKUP/PHY  [facility]  Next Visit: 05-      [facility] [place]       Camila Atkins                                                            Last  Test          Frequency    Reason                     Performed    Due Date  --------------------------------------------------------------------------------    CBC.........  12 months..  valACYclovir.............  04-   04-    Cr..........  12 months..  valACYclovir.............  04-   04-    [facility] Embedded Care Due Messages. Reference number: [id number].   3/04/2024 3:34:38 AM CST

## 2024-04-03 ENCOUNTER — PATIENT MESSAGE (OUTPATIENT)
Dept: ADMINISTRATIVE | Facility: HOSPITAL | Age: 44
End: 2024-04-03
Payer: COMMERCIAL

## 2024-04-24 ENCOUNTER — PATIENT MESSAGE (OUTPATIENT)
Dept: FAMILY MEDICINE | Facility: CLINIC | Age: 44
End: 2024-04-24
Payer: COMMERCIAL

## 2024-05-20 ENCOUNTER — PATIENT MESSAGE (OUTPATIENT)
Dept: FAMILY MEDICINE | Facility: CLINIC | Age: 44
End: 2024-05-20
Payer: COMMERCIAL

## 2024-05-20 DIAGNOSIS — G43.109 MIGRAINE WITH AURA AND WITHOUT STATUS MIGRAINOSUS, NOT INTRACTABLE: ICD-10-CM

## 2024-05-20 DIAGNOSIS — R11.0 NAUSEA: ICD-10-CM

## 2024-05-20 RX ORDER — BUTALBITAL, ACETAMINOPHEN AND CAFFEINE 50; 325; 40 MG/1; MG/1; MG/1
1 TABLET ORAL EVERY 6 HOURS PRN
Qty: 30 TABLET | Refills: 2 | Status: SHIPPED | OUTPATIENT
Start: 2024-05-20 | End: 2024-05-20 | Stop reason: SDUPTHER

## 2024-05-20 RX ORDER — BUTALBITAL, ACETAMINOPHEN AND CAFFEINE 50; 325; 40 MG/1; MG/1; MG/1
1 TABLET ORAL EVERY 6 HOURS PRN
Qty: 30 TABLET | Refills: 2 | Status: SHIPPED | OUTPATIENT
Start: 2024-05-20

## 2024-05-20 RX ORDER — ONDANSETRON 4 MG/1
4 TABLET, ORALLY DISINTEGRATING ORAL EVERY 6 HOURS PRN
Qty: 90 TABLET | Refills: 0 | Status: SHIPPED | OUTPATIENT
Start: 2024-05-20

## 2024-05-20 RX ORDER — RIMEGEPANT SULFATE 75 MG/75MG
75 TABLET, ORALLY DISINTEGRATING ORAL DAILY PRN
Qty: 16 TABLET | Refills: 2 | Status: SHIPPED | OUTPATIENT
Start: 2024-05-20

## 2024-05-20 NOTE — TELEPHONE ENCOUNTER
Care Due:                  Date            Visit Type   Department     Provider  --------------------------------------------------------------------------------                                MYCHART                              FOLLOWUP/OF  [facility]  Last Visit: 02-      FICE VISIT   MEDICINE       Camila Atkins                              MYCHART                              ANNUAL                              CHECKUP/PHY  [facility]  Next Visit: 07-      [facility]       Camila Atkins                                                            Last  Test          Frequency    Reason                     Performed    Due Date  --------------------------------------------------------------------------------    CBC.........  12 months..  valACYclovir.............  Not Found    Overdue    Cr..........  12 months..  valACYclovir.............  Not Found    Overdue    [employer] Embedded Care Due Messages. Reference number: [id number].   5/20/2024 7:20:07 AM CDT

## 2024-05-20 NOTE — PROGRESS NOTES
Meds refilled.  FYI: Do not take Nurtec and Fioricet at the same time, as Fioricet will cause Nurtec to be LESS EFFECTIVE.

## 2024-07-15 ENCOUNTER — OFFICE VISIT (OUTPATIENT)
Dept: FAMILY MEDICINE | Facility: CLINIC | Age: 44
End: 2024-07-15
Payer: COMMERCIAL

## 2024-07-15 VITALS
WEIGHT: 88.31 LBS | OXYGEN SATURATION: 98 % | HEIGHT: 55 IN | SYSTOLIC BLOOD PRESSURE: 98 MMHG | DIASTOLIC BLOOD PRESSURE: 64 MMHG | HEART RATE: 66 BPM | BODY MASS INDEX: 20.44 KG/M2

## 2024-07-15 DIAGNOSIS — Z00.00 VISIT FOR PREVENTIVE HEALTH EXAMINATION: Primary | ICD-10-CM

## 2024-07-15 DIAGNOSIS — E53.8 LOW SERUM VITAMIN B12: ICD-10-CM

## 2024-07-15 DIAGNOSIS — R23.2 HOT FLASHES: ICD-10-CM

## 2024-07-15 DIAGNOSIS — Z13.1 SCREENING FOR DIABETES MELLITUS: ICD-10-CM

## 2024-07-15 DIAGNOSIS — G43.109 MIGRAINE WITH AURA AND WITHOUT STATUS MIGRAINOSUS, NOT INTRACTABLE: ICD-10-CM

## 2024-07-15 DIAGNOSIS — Z79.899 ENCOUNTER FOR LONG-TERM CURRENT USE OF MEDICATION: ICD-10-CM

## 2024-07-15 DIAGNOSIS — Z13.220 SCREENING, LIPID: ICD-10-CM

## 2024-07-15 DIAGNOSIS — B02.29 POSTHERPETIC NEURALGIA: ICD-10-CM

## 2024-07-15 DIAGNOSIS — R61 NIGHT SWEATS: ICD-10-CM

## 2024-07-15 DIAGNOSIS — Z12.11 SCREEN FOR COLON CANCER: ICD-10-CM

## 2024-07-15 DIAGNOSIS — B02.8 HERPES ZOSTER WITH OTHER COMPLICATION: ICD-10-CM

## 2024-07-15 PROCEDURE — G2211 COMPLEX E/M VISIT ADD ON: HCPCS | Mod: S$GLB,,, | Performed by: FAMILY MEDICINE

## 2024-07-15 PROCEDURE — 99215 OFFICE O/P EST HI 40 MIN: CPT | Mod: S$GLB,,, | Performed by: FAMILY MEDICINE

## 2024-07-15 PROCEDURE — 3078F DIAST BP <80 MM HG: CPT | Mod: S$GLB,,, | Performed by: FAMILY MEDICINE

## 2024-07-15 PROCEDURE — 99999 PR PBB SHADOW E&M-EST. PATIENT-LVL IV: CPT | Mod: PBBFAC,,, | Performed by: FAMILY MEDICINE

## 2024-07-15 PROCEDURE — 1160F RVW MEDS BY RX/DR IN RCRD: CPT | Mod: S$GLB,,, | Performed by: FAMILY MEDICINE

## 2024-07-15 PROCEDURE — 1159F MED LIST DOCD IN RCRD: CPT | Mod: S$GLB,,, | Performed by: FAMILY MEDICINE

## 2024-07-15 PROCEDURE — 3008F BODY MASS INDEX DOCD: CPT | Mod: S$GLB,,, | Performed by: FAMILY MEDICINE

## 2024-07-15 PROCEDURE — 3044F HG A1C LEVEL LT 7.0%: CPT | Mod: S$GLB,,, | Performed by: FAMILY MEDICINE

## 2024-07-15 PROCEDURE — 3074F SYST BP LT 130 MM HG: CPT | Mod: S$GLB,,, | Performed by: FAMILY MEDICINE

## 2024-07-15 NOTE — PROGRESS NOTES
"  Subjective:       Patient ID: Gogo Muro is a 44 y.o. female.    Chief Complaint: Annual Exam (Pt is here for her annual check up)    45 yo female here today with complaint of feeling "off." Reports waking up drenched in sweat, having to change the sheets and clothes. Headaches worse, feels she has constant headache. Cries at the drop of a hat, just about anything seems to trigger tearfulness.  Patient is status post hysterectomy in 2016, but ovaries were left.  Reports her mother experienced menopausal changes in early to mid 40s.    Getting shingles outbreak currently and has pain R lower lateral leg. Tried to rest and relax all day yesterday, to help prevent shingles outbreak. Since her last visit with me, she had a long flare up of shingles, she has intermittent shooting pain in the area of shingles outbreak.  This episode seems different and the fact of increase in severity as well as persistent symptoms over 24 hours.  Typically, the nerve pain lasts a minute or 2 and there does not seem to be any pattern at all.    Patient also with constant headache that per the last couple of months.  Nurtec no longer seems to be beneficial.  Has migraine pain intermittently, but definitely persistent headache every day.  No change in sense of smell taste.  She did have an eye exam recently and had prescription change.    Wakes up between 1-3am, usually to empty bladder. Then realizes she is soaking wet. Sometimes she wakes up feeling cold, and realizes she is wet and has kicked off the covers.              2/6/2024     8:12 AM 1/11/2023     7:26 AM 4/11/2022     1:30 PM   Depression Patient Health Questionnaire   Over the last two weeks how often have you been bothered by little interest or pleasure in doing things Not at all Not at all Not at all   Over the last two weeks how often have you been bothered by feeling down, depressed or hopeless Not at all Not at all Not at all   PHQ-2 Total Score 0 0 0         " 7/15/2024    12:16 PM   GAD7   1. Feeling nervous, anxious, or on edge? 0   2. Not being able to stop or control worrying? 0   3. Worrying too much about different things? 0   4. Trouble relaxing? 1   5. Being so restless that it is hard to sit still? 0   6. Becoming easily annoyed or irritable? 1   7. Feeling afraid as if something awful might happen? 0   8. If you checked off any problems, how difficult have these problems made it for you to do your work, take care of things at home, or get along with other people? 0   NURIS-7 Score 2       Review of Systems   All other systems reviewed and are negative.        Past Medical History:   Diagnosis Date    Hx of migraines     Pancreatitis     PONV (postoperative nausea and vomiting)      Past Surgical History:   Procedure Laterality Date     SECTION      x 3    EYE SURGERY      HYSTERECTOMY      2016     Family History   Problem Relation Name Age of Onset    Hypertension Mother Shey     Deep vein thrombosis Father      Hypertension Maternal Grandmother Grandmother     Deep vein thrombosis Maternal Grandmother Grandmother     Cancer Maternal Grandfather Grandfather         Lung    Stroke Brother Josep     Breast cancer Neg Hx         Review of patient's allergies indicates:   Allergen Reactions    Sulfamethoxazole-trimethoprim     Cephalexin Rash    Ibuprofen Palpitations    Sulfa (sulfonamide antibiotics) Rash       Current Outpatient Medications:     butalbital-acetaminophen-caffeine -40 mg (FIORICET, ESGIC) -40 mg per tablet, Take 1 tablet by mouth every 6 (six) hours as needed for Headaches., Disp: 30 tablet, Rfl: 2    cyclobenzaprine (FLEXERIL) 5 MG tablet, Take 5 mg by mouth 2 (two) times daily as needed., Disp: , Rfl:     diphenoxylate-atropine 2.5-0.025 mg (LOMOTIL) 2.5-0.025 mg per tablet, Take 1 tablet by mouth as needed., Disp: , Rfl:     gabapentin (NEURONTIN) 100 MG capsule, TAKE 1 CAPSULE(100 MG) BY MOUTH THREE TIMES DAILY AS NEEDED FOR  "NERVE PAIN, Disp: 90 capsule, Rfl: 2    ivermectin (SOOLANTRA) 1 % Crea, Apply 1 Units topically once daily., Disp: , Rfl:     mupirocin (BACTROBAN) 2 % ointment, Apply topically 3 (three) times daily., Disp: 15 g, Rfl: 1    NURTEC 75 mg odt, Take 1 tablet (75 mg total) by mouth daily as needed for Migraine., Disp: 16 tablet, Rfl: 2    ondansetron (ZOFRAN-ODT) 4 MG TbDL, Take 1 tablet (4 mg total) by mouth every 6 (six) hours as needed (nausea)., Disp: 90 tablet, Rfl: 0    scopolamine (TRANSDERM-SCOP) 1.3-1.5 mg (1 mg over 3 days), Place 1 patch onto the skin every 72 hours., Disp: 4 patch, Rfl: 0    valACYclovir (VALTREX) 500 MG tablet, Take 2 tablets (1,000 mg total) by mouth 2 (two) times daily., Disp: 120 tablet, Rfl: 0    valACYclovir (VALTREX) 1000 MG tablet, Take 2 tablets twice daily for 2 days at onset of viral outbreak; can continue on day #3 with 1 tablet twice daily for up to 10 days total., Disp: 60 tablet, Rfl: 5      Objective:      BP 98/64 (BP Location: Right arm, Patient Position: Sitting, BP Method: Small (Manual))   Pulse 66   Ht 4' 7" (1.397 m)   Wt 40.1 kg (88 lb 4.7 oz)   SpO2 98%   BMI 20.52 kg/m²   Physical Exam  Vitals and nursing note reviewed.   Constitutional:       General: She is not in acute distress.     Appearance: She is well-developed. She is not ill-appearing, toxic-appearing or diaphoretic.   HENT:      Nose: Nose normal.      Mouth/Throat:      Mouth: Mucous membranes are moist.      Pharynx: No oropharyngeal exudate.   Eyes:      General: No scleral icterus.        Right eye: No discharge.         Left eye: No discharge.      Extraocular Movements: Extraocular movements intact.      Conjunctiva/sclera: Conjunctivae normal.      Pupils: Pupils are equal, round, and reactive to light.   Neck:      Vascular: No carotid bruit.   Cardiovascular:      Rate and Rhythm: Normal rate and regular rhythm.      Heart sounds: Normal heart sounds.   Pulmonary:      Effort: Pulmonary " effort is normal. No respiratory distress.   Abdominal:      General: There is no distension.      Palpations: Abdomen is soft.   Musculoskeletal:      Cervical back: Neck supple.      Right lower leg: No edema.      Left lower leg: No edema.   Skin:     General: Skin is warm and dry.      Capillary Refill: Capillary refill takes less than 2 seconds.      Coloration: Skin is not jaundiced or pale.   Neurological:      General: No focal deficit present.      Mental Status: She is alert and oriented to person, place, and time. Mental status is at baseline.      Coordination: Coordination normal.   Psychiatric:         Mood and Affect: Mood normal.         Behavior: Behavior normal.         Thought Content: Thought content normal.         Judgment: Judgment normal.         Assessment:       1. Visit for preventive health examination    2. Hot flashes    3. Night sweats    4. Herpes zoster with other complication    5. Migraine with aura and without status migrainosus, not intractable    6. Postherpetic neuralgia    7. Low serum vitamin B12    8. Encounter for long-term current use of medication    9. Screening, lipid    10. Screening for diabetes mellitus    11. Screen for colon cancer        Plan:       Visit for preventive health examination  -     DHEA-Sulfate; Future; Expected date: 07/15/2024  -     Estradiol; Future; Expected date: 07/15/2024  -     Follicle Stimulating Hormone; Future; Expected date: 07/15/2024  -     Insulin, Random; Future; Expected date: 07/15/2024  -     Luteinizing Hormone; Future; Expected date: 07/15/2024  -     Progesterone; Future; Expected date: 07/15/2024  -     Prolactin; Future; Expected date: 07/15/2024  -     Sex Hormone Binding Globulin; Future; Expected date: 07/15/2024  -     T4, Free; Future; Expected date: 07/15/2024  -     Testosterone; Future; Expected date: 07/15/2024  -     Testosterone, Free; Future; Expected date: 07/15/2024  -     TSH; Future; Expected date:  07/15/2024  -     T3, Free; Future; Expected date: 07/15/2024  -     Thyroid Peroxidase Antibody; Future; Expected date: 07/15/2024  -     CBC Auto Differential; Future; Expected date: 07/15/2024  -     Comprehensive Metabolic Panel; Future; Expected date: 07/15/2024  -     Hemoglobin A1C; Future; Expected date: 07/15/2024  -     Lipid Panel; Future; Expected date: 07/15/2024  -     Vitamin D; Future; Expected date: 07/15/2024  -     Vitamin B1; Future; Expected date: 07/15/2024  -     Vitamin B12; Future; Expected date: 07/15/2024  -     Vitamin B2; Future; Expected date: 07/15/2024  -     Vitamin B6; Future; Expected date: 07/15/2024  -     Iron and TIBC; Future; Expected date: 07/15/2024  -     Ferritin; Future; Expected date: 07/15/2024  -     Folate; Future; Expected date: 07/15/2024  -     Magnesium; Future; Expected date: 07/15/2024    Hot flashes  -     DHEA-Sulfate; Future; Expected date: 07/15/2024  -     Estradiol; Future; Expected date: 07/15/2024  -     Follicle Stimulating Hormone; Future; Expected date: 07/15/2024  -     Insulin, Random; Future; Expected date: 07/15/2024  -     Luteinizing Hormone; Future; Expected date: 07/15/2024  -     Progesterone; Future; Expected date: 07/15/2024  -     Prolactin; Future; Expected date: 07/15/2024  -     Sex Hormone Binding Globulin; Future; Expected date: 07/15/2024  -     T4, Free; Future; Expected date: 07/15/2024  -     Testosterone; Future; Expected date: 07/15/2024  -     Testosterone, Free; Future; Expected date: 07/15/2024  -     TSH; Future; Expected date: 07/15/2024  -     T3, Free; Future; Expected date: 07/15/2024  -     Thyroid Peroxidase Antibody; Future; Expected date: 07/15/2024    Night sweats  -     DHEA-Sulfate; Future; Expected date: 07/15/2024  -     Estradiol; Future; Expected date: 07/15/2024  -     Follicle Stimulating Hormone; Future; Expected date: 07/15/2024  -     Insulin, Random; Future; Expected date: 07/15/2024  -     Luteinizing  Hormone; Future; Expected date: 07/15/2024  -     Progesterone; Future; Expected date: 07/15/2024  -     Prolactin; Future; Expected date: 07/15/2024  -     Sex Hormone Binding Globulin; Future; Expected date: 07/15/2024  -     T4, Free; Future; Expected date: 07/15/2024  -     Testosterone; Future; Expected date: 07/15/2024  -     Testosterone, Free; Future; Expected date: 07/15/2024  -     TSH; Future; Expected date: 07/15/2024  -     T3, Free; Future; Expected date: 07/15/2024  -     Thyroid Peroxidase Antibody; Future; Expected date: 07/15/2024    Herpes zoster with other complication  -     valACYclovir (VALTREX) 1000 MG tablet; Take 2 tablets twice daily for 2 days at onset of viral outbreak; can continue on day #3 with 1 tablet twice daily for up to 10 days total.  Dispense: 60 tablet; Refill: 5    Migraine with aura and without status migrainosus, not intractable  -     CBC Auto Differential; Future; Expected date: 07/15/2024  -     Comprehensive Metabolic Panel; Future; Expected date: 07/15/2024  -     Vitamin D; Future; Expected date: 07/15/2024  -     Vitamin B1; Future; Expected date: 07/15/2024  -     Vitamin B12; Future; Expected date: 07/15/2024  -     Vitamin B2; Future; Expected date: 07/15/2024  -     Vitamin B6; Future; Expected date: 07/15/2024  -     Iron and TIBC; Future; Expected date: 07/15/2024  -     Ferritin; Future; Expected date: 07/15/2024  -     Folate; Future; Expected date: 07/15/2024  -     Magnesium; Future; Expected date: 07/15/2024    Postherpetic neuralgia  -     CBC Auto Differential; Future; Expected date: 07/15/2024  -     Comprehensive Metabolic Panel; Future; Expected date: 07/15/2024  -     Vitamin D; Future; Expected date: 07/15/2024  -     Vitamin B1; Future; Expected date: 07/15/2024  -     Vitamin B12; Future; Expected date: 07/15/2024  -     Vitamin B2; Future; Expected date: 07/15/2024  -     Vitamin B6; Future; Expected date: 07/15/2024  -     Iron and TIBC; Future;  Expected date: 07/15/2024  -     Ferritin; Future; Expected date: 07/15/2024  -     Folate; Future; Expected date: 07/15/2024  -     valACYclovir (VALTREX) 1000 MG tablet; Take 2 tablets twice daily for 2 days at onset of viral outbreak; can continue on day #3 with 1 tablet twice daily for up to 10 days total.  Dispense: 60 tablet; Refill: 5    Low serum vitamin B12  -     CBC Auto Differential; Future; Expected date: 07/15/2024  -     Vitamin B1; Future; Expected date: 07/15/2024  -     Vitamin B12; Future; Expected date: 07/15/2024  -     Vitamin B2; Future; Expected date: 07/15/2024  -     Vitamin B6; Future; Expected date: 07/15/2024  -     Iron and TIBC; Future; Expected date: 07/15/2024  -     Ferritin; Future; Expected date: 07/15/2024  -     Folate; Future; Expected date: 07/15/2024    Encounter for long-term current use of medication  -     Cologuard Screening (Multitarget Stool DNA); Future; Expected date: 07/15/2024  -     DHEA-Sulfate; Future; Expected date: 07/15/2024  -     Estradiol; Future; Expected date: 07/15/2024  -     Follicle Stimulating Hormone; Future; Expected date: 07/15/2024  -     Insulin, Random; Future; Expected date: 07/15/2024  -     Luteinizing Hormone; Future; Expected date: 07/15/2024  -     Progesterone; Future; Expected date: 07/15/2024  -     Prolactin; Future; Expected date: 07/15/2024  -     Sex Hormone Binding Globulin; Future; Expected date: 07/15/2024  -     T4, Free; Future; Expected date: 07/15/2024  -     Testosterone; Future; Expected date: 07/15/2024  -     Testosterone, Free; Future; Expected date: 07/15/2024  -     TSH; Future; Expected date: 07/15/2024  -     T3, Free; Future; Expected date: 07/15/2024  -     Thyroid Peroxidase Antibody; Future; Expected date: 07/15/2024  -     CBC Auto Differential; Future; Expected date: 07/15/2024  -     Comprehensive Metabolic Panel; Future; Expected date: 07/15/2024  -     Hemoglobin A1C; Future; Expected date: 07/15/2024  -      Lipid Panel; Future; Expected date: 07/15/2024  -     Vitamin D; Future; Expected date: 07/15/2024  -     Vitamin B1; Future; Expected date: 07/15/2024  -     Vitamin B12; Future; Expected date: 07/15/2024  -     Vitamin B2; Future; Expected date: 07/15/2024  -     Vitamin B6; Future; Expected date: 07/15/2024  -     Iron and TIBC; Future; Expected date: 07/15/2024  -     Ferritin; Future; Expected date: 07/15/2024  -     Folate; Future; Expected date: 07/15/2024  -     Magnesium; Future; Expected date: 07/15/2024    Screening, lipid  -     Lipid Panel; Future; Expected date: 07/15/2024    Screening for diabetes mellitus  -     Hemoglobin A1C; Future; Expected date: 07/15/2024    Screen for colon cancer  -     Cologuard Screening (Multitarget Stool DNA); Future; Expected date: 07/15/2024          [facility] for [facility] of [facility].  Okay tot use Fioricet prn tension or complicated migraine  that is persistent.  Patient cannot tolerate NSAIDs due to palpitations.    Yam supplement for menopasual symptoms.      Previous records in Epic were reviewed, including the last 3 months of encounters, imaging, laboratory, and pathology reports.    Strict return precautions reviewed and patient verbalized understanding. Risks, benefits, and alternatives to the plan were reviewed in detail and all questions answered to the patient's satisfaction. Patient agrees to return to clinic or ER if symptoms worsen. 40 minutes total were spent on today's visit, not limited to but including time based on counseling and coordination of care.    Patient instructed that best way to communicate with my office staff is for patient to get on the [facility] epic patient portal to expedite communication and communication issues that may occur.  Patient was given instructions on how to get on the portal.  I encouraged patient to obtain portal access as well.  Ultimately it is up to the patient to obtain access.  Patient voiced  understanding.    This note was created using [employer] voice recognition software that occasionally may misinterpret phrases or words.    Follow up in about 4 weeks (around 8/12/2024) for follow up lab results.

## 2024-07-15 NOTE — PATIENT INSTRUCTIONS
For shingles outbreak: at the first sign of outbreak, take 2000mg 2 times daily for 2 days, THEN follow instructions on the bottle of Valtrex for the following 10 days.    Fasting labs, within 2 hours of waking, without any meds before the lab draw.

## 2024-07-19 ENCOUNTER — LAB VISIT (OUTPATIENT)
Dept: LAB | Facility: HOSPITAL | Age: 44
End: 2024-07-19
Attending: FAMILY MEDICINE
Payer: COMMERCIAL

## 2024-07-19 DIAGNOSIS — Z13.220 SCREENING, LIPID: ICD-10-CM

## 2024-07-19 DIAGNOSIS — Z79.899 ENCOUNTER FOR LONG-TERM CURRENT USE OF MEDICATION: ICD-10-CM

## 2024-07-19 DIAGNOSIS — R61 NIGHT SWEATS: ICD-10-CM

## 2024-07-19 DIAGNOSIS — Z13.1 SCREENING FOR DIABETES MELLITUS: ICD-10-CM

## 2024-07-19 DIAGNOSIS — Z00.00 VISIT FOR PREVENTIVE HEALTH EXAMINATION: ICD-10-CM

## 2024-07-19 DIAGNOSIS — E53.8 LOW SERUM VITAMIN B12: ICD-10-CM

## 2024-07-19 DIAGNOSIS — R23.2 HOT FLASHES: ICD-10-CM

## 2024-07-19 DIAGNOSIS — G43.109 MIGRAINE WITH AURA AND WITHOUT STATUS MIGRAINOSUS, NOT INTRACTABLE: ICD-10-CM

## 2024-07-19 LAB
25(OH)D3+25(OH)D2 SERPL-MCNC: 26 NG/ML (ref 30–96)
ALBUMIN SERPL BCP-MCNC: 4.3 G/DL (ref 3.5–5.2)
ALP SERPL-CCNC: 44 U/L (ref 55–135)
ALT SERPL W/O P-5'-P-CCNC: 17 U/L (ref 10–44)
ANION GAP SERPL CALC-SCNC: 11 MMOL/L (ref 8–16)
AST SERPL-CCNC: 19 U/L (ref 10–40)
BASOPHILS # BLD AUTO: 0.04 K/UL (ref 0–0.2)
BASOPHILS NFR BLD: 0.7 % (ref 0–1.9)
BILIRUB SERPL-MCNC: 0.9 MG/DL (ref 0.1–1)
BUN SERPL-MCNC: 10 MG/DL (ref 6–20)
CALCIUM SERPL-MCNC: 9.4 MG/DL (ref 8.7–10.5)
CHLORIDE SERPL-SCNC: 106 MMOL/L (ref 95–110)
CHOLEST SERPL-MCNC: 188 MG/DL (ref 120–199)
CHOLEST/HDLC SERPL: 2.8 {RATIO} (ref 2–5)
CO2 SERPL-SCNC: 21 MMOL/L (ref 23–29)
CREAT SERPL-MCNC: 0.8 MG/DL (ref 0.5–1.4)
DHEA-S SERPL-MCNC: 173 UG/DL (ref 56.2–282.9)
DIFFERENTIAL METHOD BLD: NORMAL
EOSINOPHIL # BLD AUTO: 0.1 K/UL (ref 0–0.5)
EOSINOPHIL NFR BLD: 2.1 % (ref 0–8)
ERYTHROCYTE [DISTWIDTH] IN BLOOD BY AUTOMATED COUNT: 11.8 % (ref 11.5–14.5)
EST. GFR  (NO RACE VARIABLE): >60 ML/MIN/1.73 M^2
ESTIMATED AVG GLUCOSE: 91 MG/DL (ref 68–131)
ESTRADIOL SERPL-MCNC: 99 PG/ML
FERRITIN SERPL-MCNC: 92 NG/ML (ref 20–300)
FOLATE SERPL-MCNC: 9.2 NG/ML (ref 4–24)
FSH SERPL-ACNC: 9.18 MIU/ML
GLUCOSE SERPL-MCNC: 88 MG/DL (ref 70–110)
HBA1C MFR BLD: 4.8 % (ref 4–5.6)
HCT VFR BLD AUTO: 41.5 % (ref 37–48.5)
HDLC SERPL-MCNC: 67 MG/DL (ref 40–75)
HDLC SERPL: 35.6 % (ref 20–50)
HGB BLD-MCNC: 13.9 G/DL (ref 12–16)
IMM GRANULOCYTES # BLD AUTO: 0.01 K/UL (ref 0–0.04)
IMM GRANULOCYTES NFR BLD AUTO: 0.2 % (ref 0–0.5)
INSULIN COLLECTION INTERVAL: NORMAL
INSULIN SERPL-ACNC: 3.2 UU/ML
IRON SERPL-MCNC: 107 UG/DL (ref 30–160)
LDLC SERPL CALC-MCNC: 110.8 MG/DL (ref 63–159)
LH SERPL-ACNC: 2.6 MIU/ML
LYMPHOCYTES # BLD AUTO: 1.7 K/UL (ref 1–4.8)
LYMPHOCYTES NFR BLD: 30.8 % (ref 18–48)
MAGNESIUM SERPL-MCNC: 1.9 MG/DL (ref 1.6–2.6)
MCH RBC QN AUTO: 30.8 PG (ref 27–31)
MCHC RBC AUTO-ENTMCNC: 33.5 G/DL (ref 32–36)
MCV RBC AUTO: 92 FL (ref 82–98)
MONOCYTES # BLD AUTO: 0.3 K/UL (ref 0.3–1)
MONOCYTES NFR BLD: 6 % (ref 4–15)
NEUTROPHILS # BLD AUTO: 3.2 K/UL (ref 1.8–7.7)
NEUTROPHILS NFR BLD: 60.2 % (ref 38–73)
NONHDLC SERPL-MCNC: 121 MG/DL
NRBC BLD-RTO: 0 /100 WBC
PLATELET # BLD AUTO: 234 K/UL (ref 150–450)
PMV BLD AUTO: 9.8 FL (ref 9.2–12.9)
POTASSIUM SERPL-SCNC: 4.1 MMOL/L (ref 3.5–5.1)
PROGEST SERPL-MCNC: 0.3 NG/ML
PROLACTIN SERPL IA-MCNC: 12.3 NG/ML (ref 5.2–26.5)
PROT SERPL-MCNC: 7.5 G/DL (ref 6–8.4)
RBC # BLD AUTO: 4.52 M/UL (ref 4–5.4)
SATURATED IRON: 30 % (ref 20–50)
SODIUM SERPL-SCNC: 138 MMOL/L (ref 136–145)
T3FREE SERPL-MCNC: 2.6 PG/ML (ref 2.3–4.2)
T4 FREE SERPL-MCNC: 1.01 NG/DL (ref 0.71–1.51)
TESTOST SERPL-MCNC: 22 NG/DL (ref 5–73)
THYROPEROXIDASE IGG SERPL-ACNC: <6 IU/ML
TOTAL IRON BINDING CAPACITY: 357 UG/DL (ref 250–450)
TRANSFERRIN SERPL-MCNC: 241 MG/DL (ref 200–375)
TRIGL SERPL-MCNC: 51 MG/DL (ref 30–150)
TSH SERPL DL<=0.005 MIU/L-ACNC: 1.16 UIU/ML (ref 0.4–4)
VIT B12 SERPL-MCNC: 1273 PG/ML (ref 210–950)
WBC # BLD AUTO: 5.36 K/UL (ref 3.9–12.7)

## 2024-07-19 PROCEDURE — 84207 ASSAY OF VITAMIN B-6: CPT | Performed by: FAMILY MEDICINE

## 2024-07-19 PROCEDURE — 84481 FREE ASSAY (FT-3): CPT | Performed by: FAMILY MEDICINE

## 2024-07-19 PROCEDURE — 82728 ASSAY OF FERRITIN: CPT | Performed by: FAMILY MEDICINE

## 2024-07-19 PROCEDURE — 83036 HEMOGLOBIN GLYCOSYLATED A1C: CPT | Performed by: FAMILY MEDICINE

## 2024-07-19 PROCEDURE — 83525 ASSAY OF INSULIN: CPT | Performed by: FAMILY MEDICINE

## 2024-07-19 PROCEDURE — 84270 ASSAY OF SEX HORMONE GLOBUL: CPT | Performed by: FAMILY MEDICINE

## 2024-07-19 PROCEDURE — 83001 ASSAY OF GONADOTROPIN (FSH): CPT | Performed by: FAMILY MEDICINE

## 2024-07-19 PROCEDURE — 84403 ASSAY OF TOTAL TESTOSTERONE: CPT | Performed by: FAMILY MEDICINE

## 2024-07-19 PROCEDURE — 84146 ASSAY OF PROLACTIN: CPT | Performed by: FAMILY MEDICINE

## 2024-07-19 PROCEDURE — 80053 COMPREHEN METABOLIC PANEL: CPT | Performed by: FAMILY MEDICINE

## 2024-07-19 PROCEDURE — 84439 ASSAY OF FREE THYROXINE: CPT | Performed by: FAMILY MEDICINE

## 2024-07-19 PROCEDURE — 86376 MICROSOMAL ANTIBODY EACH: CPT | Performed by: FAMILY MEDICINE

## 2024-07-19 PROCEDURE — 84144 ASSAY OF PROGESTERONE: CPT | Performed by: FAMILY MEDICINE

## 2024-07-19 PROCEDURE — 84425 ASSAY OF VITAMIN B-1: CPT | Performed by: FAMILY MEDICINE

## 2024-07-19 PROCEDURE — 85025 COMPLETE CBC W/AUTO DIFF WBC: CPT | Performed by: FAMILY MEDICINE

## 2024-07-19 PROCEDURE — 82746 ASSAY OF FOLIC ACID SERUM: CPT | Performed by: FAMILY MEDICINE

## 2024-07-19 PROCEDURE — 80061 LIPID PANEL: CPT | Performed by: FAMILY MEDICINE

## 2024-07-19 PROCEDURE — 83540 ASSAY OF IRON: CPT | Performed by: FAMILY MEDICINE

## 2024-07-19 PROCEDURE — 84443 ASSAY THYROID STIM HORMONE: CPT | Performed by: FAMILY MEDICINE

## 2024-07-19 PROCEDURE — 82627 DEHYDROEPIANDROSTERONE: CPT | Performed by: FAMILY MEDICINE

## 2024-07-19 PROCEDURE — 82607 VITAMIN B-12: CPT | Performed by: FAMILY MEDICINE

## 2024-07-19 PROCEDURE — 83002 ASSAY OF GONADOTROPIN (LH): CPT | Performed by: FAMILY MEDICINE

## 2024-07-19 PROCEDURE — 84252 ASSAY OF VITAMIN B-2: CPT | Performed by: FAMILY MEDICINE

## 2024-07-19 PROCEDURE — 82670 ASSAY OF TOTAL ESTRADIOL: CPT | Performed by: FAMILY MEDICINE

## 2024-07-19 PROCEDURE — 83735 ASSAY OF MAGNESIUM: CPT | Performed by: FAMILY MEDICINE

## 2024-07-19 PROCEDURE — 84402 ASSAY OF FREE TESTOSTERONE: CPT | Performed by: FAMILY MEDICINE

## 2024-07-19 PROCEDURE — 82306 VITAMIN D 25 HYDROXY: CPT | Performed by: FAMILY MEDICINE

## 2024-07-21 RX ORDER — VALACYCLOVIR HYDROCHLORIDE 1 G/1
TABLET, FILM COATED ORAL
Qty: 60 TABLET | Refills: 5 | Status: SHIPPED | OUTPATIENT
Start: 2024-07-21

## 2024-07-22 LAB
SHBG SERPL-SCNC: 84 NMOL/L
TESTOST FREE SERPL-MCNC: 0.8 PG/ML

## 2024-07-23 LAB
PYRIDOXAL SERPL-MCNC: 39 UG/L (ref 5–50)
VIT B1 BLD-MCNC: 53 UG/L (ref 38–122)
VIT B2 SERPL-MCNC: 6 MCG/L (ref 1–19)

## 2024-07-27 ENCOUNTER — PATIENT MESSAGE (OUTPATIENT)
Dept: ADMINISTRATIVE | Facility: HOSPITAL | Age: 44
End: 2024-07-27
Payer: COMMERCIAL

## 2024-07-31 DIAGNOSIS — R11.0 NAUSEA: ICD-10-CM

## 2024-07-31 NOTE — TELEPHONE ENCOUNTER
Refill Routing Note   Medication(s) are not appropriate for processing by [facility] for the following reason(s):        Outside of protocol    [facility] action(s):  Route               Appointments  past 12m or future 3m with PCP    Date Provider   Last Visit   7/15/2024 Caimla Atkins MD   Next Visit   8/15/2024 Camila Atkins MD   ED visits in past 90 days: 0        Note composed:9:22 AM 07/31/2024

## 2024-07-31 NOTE — TELEPHONE ENCOUNTER
No care due was identified.  [facility] Embedded Care Due Messages. Reference number: [id number].   7/31/2024 7:36:06 AM CDT

## 2024-08-01 RX ORDER — ONDANSETRON 4 MG/1
4 TABLET, ORALLY DISINTEGRATING ORAL EVERY 6 HOURS PRN
Qty: 90 TABLET | Refills: 0 | Status: SHIPPED | OUTPATIENT
Start: 2024-08-01

## 2024-08-02 ENCOUNTER — HOSPITAL ENCOUNTER (OUTPATIENT)
Dept: RADIOLOGY | Facility: HOSPITAL | Age: 44
Discharge: HOME | End: 2024-08-02
Attending: FAMILY MEDICINE
Payer: COMMERCIAL

## 2024-08-02 DIAGNOSIS — Z12.31 OTHER SCREENING MAMMOGRAM: ICD-10-CM

## 2024-08-02 PROCEDURE — 77067 SCR MAMMO BI INCL CAD: CPT | Mod: TC

## 2024-08-02 PROCEDURE — 77063 BREAST TOMOSYNTHESIS BI: CPT | Mod: 26,,, | Performed by: RADIOLOGY

## 2024-08-02 PROCEDURE — 77067 SCR MAMMO BI INCL CAD: CPT | Mod: 26,,, | Performed by: RADIOLOGY

## 2024-08-04 DIAGNOSIS — R92.8 ABNORMALITY OF LEFT BREAST ON SCREENING MAMMOGRAM: Primary | ICD-10-CM

## 2024-08-12 ENCOUNTER — HOSPITAL ENCOUNTER (OUTPATIENT)
Dept: RADIOLOGY | Facility: HOSPITAL | Age: 44
Discharge: HOME | End: 2024-08-12
Attending: FAMILY MEDICINE
Payer: COMMERCIAL

## 2024-08-12 DIAGNOSIS — R92.8 ABNORMALITY OF LEFT BREAST ON SCREENING MAMMOGRAM: ICD-10-CM

## 2024-08-12 PROCEDURE — 77061 BREAST TOMOSYNTHESIS UNI: CPT | Mod: TC,LT

## 2024-08-12 PROCEDURE — 77065 DX MAMMO INCL CAD UNI: CPT | Mod: 26,LT,, | Performed by: RADIOLOGY

## 2024-08-12 PROCEDURE — 77061 BREAST TOMOSYNTHESIS UNI: CPT | Mod: 26,LT,, | Performed by: RADIOLOGY

## 2024-08-12 PROCEDURE — 77065 DX MAMMO INCL CAD UNI: CPT | Mod: TC,LT

## 2024-08-15 ENCOUNTER — PATIENT MESSAGE (OUTPATIENT)
Dept: FAMILY MEDICINE | Facility: CLINIC | Age: 44
End: 2024-08-15

## 2024-08-15 ENCOUNTER — OFFICE VISIT (OUTPATIENT)
Dept: FAMILY MEDICINE | Facility: CLINIC | Age: 44
End: 2024-08-15
Payer: COMMERCIAL

## 2024-08-15 VITALS — BODY MASS INDEX: 20.13 KG/M2 | HEIGHT: 55 IN | WEIGHT: 87 LBS

## 2024-08-15 DIAGNOSIS — R11.0 NAUSEA: ICD-10-CM

## 2024-08-15 DIAGNOSIS — Z71.84 TRAVEL ADVICE ENCOUNTER: ICD-10-CM

## 2024-08-15 DIAGNOSIS — R92.8 ABNORMAL FINDING ON BREAST IMAGING: Primary | ICD-10-CM

## 2024-08-15 DIAGNOSIS — G43.109 MIGRAINE WITH AURA AND WITHOUT STATUS MIGRAINOSUS, NOT INTRACTABLE: ICD-10-CM

## 2024-08-15 PROCEDURE — 1159F MED LIST DOCD IN RCRD: CPT | Mod: 95,,, | Performed by: FAMILY MEDICINE

## 2024-08-15 PROCEDURE — 3044F HG A1C LEVEL LT 7.0%: CPT | Mod: 95,,, | Performed by: FAMILY MEDICINE

## 2024-08-15 PROCEDURE — 99214 OFFICE O/P EST MOD 30 MIN: CPT | Mod: 95,,, | Performed by: FAMILY MEDICINE

## 2024-08-15 PROCEDURE — G2211 COMPLEX E/M VISIT ADD ON: HCPCS | Mod: 95,,, | Performed by: FAMILY MEDICINE

## 2024-08-15 PROCEDURE — 3008F BODY MASS INDEX DOCD: CPT | Mod: 95,,, | Performed by: FAMILY MEDICINE

## 2024-08-15 PROCEDURE — 1160F RVW MEDS BY RX/DR IN RCRD: CPT | Mod: 95,,, | Performed by: FAMILY MEDICINE

## 2024-08-15 RX ORDER — BUTALBITAL, ACETAMINOPHEN AND CAFFEINE 50; 325; 40 MG/1; MG/1; MG/1
1 TABLET ORAL EVERY 6 HOURS PRN
Qty: 30 TABLET | Refills: 2 | Status: SHIPPED | OUTPATIENT
Start: 2024-08-15

## 2024-08-15 RX ORDER — SCOLOPAMINE TRANSDERMAL SYSTEM 1 MG/1
1 PATCH, EXTENDED RELEASE TRANSDERMAL
Qty: 10 PATCH | Refills: 0 | Status: SHIPPED | OUTPATIENT
Start: 2024-08-15

## 2024-08-26 NOTE — PATIENT INSTRUCTIONS
Luis Bowens,     If you are due for any health screening(s) below please notify me so we can arrange them to be ordered and scheduled. Most healthy patients at your age complete them, but you are free to accept or refuse.     If you can't do it, I'll definitely understand. If you can, I'd certainly appreciate it!    Tests to Keep You Healthy    Mammogram: Met on 8/12/2024  Colon Cancer Screening: ORDERED      Its time for your colon cancer screening     Colorectal cancer is one of the leading causes of cancer death for men and women but it doesnt have to be. Screenings can prevent colorectal cancer or find it early enough to treat and cure the disease.     Our records indicate that you may be overdue for colon cancer screening. A colonoscopy or stool screening test can help identify patients at risk for developing colon cancer. Cancer screenings save lives, so schedule yours today to stay healthy.     A colonoscopy is the preferred test for detecting colon cancer. It is needed only once every 10 years if results are negative. While you are sedated, a flexible, lighted tube with a tiny camera is inserted into the rectum and advanced through the colon to look for cancers.     An alternative screening test that is used at home and returned to the lab may also be used. It detects hidden blood in bowel movements which could indicate cancer in the colon. If results are positive, you will need a colonoscopy to determine if the blood is a sign of cancer. This type of follow up (diagnostic) colonoscopy usually requires additional copays as required by your insurance provider.     If you recently had your colon cancer screening performed outside of [facility], please let your Health care team know so that they can update your health record. Please contact your PCP if you have any questions.

## 2024-08-27 ENCOUNTER — HOSPITAL ENCOUNTER (OUTPATIENT)
Dept: RADIOLOGY | Facility: HOSPITAL | Age: 44
Discharge: HOME | End: 2024-08-27
Attending: FAMILY MEDICINE
Payer: COMMERCIAL

## 2024-08-27 DIAGNOSIS — R92.8 ABNORMAL MAMMOGRAM OF LEFT BREAST: ICD-10-CM

## 2024-08-27 PROCEDURE — 27202473 MAMMO BREAST STEREOTACTIC BREAST BIOPSY LEFT

## 2024-08-27 PROCEDURE — 25000003 PHARM REV CODE 250: Performed by: RADIOLOGY

## 2024-08-27 PROCEDURE — 19081 BX BREAST 1ST LESION STRTCTC: CPT | Mod: LT

## 2024-08-27 PROCEDURE — 88305 TISSUE EXAM BY PATHOLOGIST: CPT | Performed by: PATHOLOGY

## 2024-08-27 PROCEDURE — 19081 BX BREAST 1ST LESION STRTCTC: CPT | Mod: LT,,, | Performed by: RADIOLOGY

## 2024-08-27 RX ORDER — LIDOCAINE HYDROCHLORIDE 10 MG/ML
10 INJECTION, SOLUTION INFILTRATION; PERINEURAL ONCE
Status: COMPLETED | OUTPATIENT
Start: 2024-08-27 | End: 2024-08-27

## 2024-08-27 RX ADMIN — LIDOCAINE HYDROCHLORIDE 10 ML: 10 INJECTION, SOLUTION INFILTRATION; PERINEURAL at 01:08

## 2024-08-27 RX ADMIN — LIDOCAINE HYDROCHLORIDE 10 ML: 10; .005 INJECTION, SOLUTION EPIDURAL; INFILTRATION; INTRACAUDAL; PERINEURAL at 01:08

## 2024-08-30 LAB
COMMENT: NORMAL
FINAL PATHOLOGIC DIAGNOSIS: NORMAL
GROSS: NORMAL
Lab: NORMAL

## 2024-09-12 DIAGNOSIS — N63.20 MASS OF LEFT BREAST, UNSPECIFIED QUADRANT: Primary | ICD-10-CM

## 2024-09-13 ENCOUNTER — PATIENT MESSAGE (OUTPATIENT)
Dept: FAMILY MEDICINE | Facility: CLINIC | Age: 44
End: 2024-09-13
Payer: COMMERCIAL

## 2024-10-09 ENCOUNTER — PATIENT MESSAGE (OUTPATIENT)
Dept: ADMINISTRATIVE | Facility: HOSPITAL | Age: 44
End: 2024-10-09
Payer: COMMERCIAL

## 2024-10-23 DIAGNOSIS — G43.109 MIGRAINE WITH AURA AND WITHOUT STATUS MIGRAINOSUS, NOT INTRACTABLE: ICD-10-CM

## 2024-10-23 RX ORDER — BUTALBITAL, ACETAMINOPHEN AND CAFFEINE 50; 325; 40 MG/1; MG/1; MG/1
1 TABLET ORAL EVERY 6 HOURS PRN
Qty: 30 TABLET | Refills: 2 | Status: SHIPPED | OUTPATIENT
Start: 2024-10-23

## 2024-10-23 NOTE — TELEPHONE ENCOUNTER
No care due was identified.  [facility] Embedded Care Due Messages. Reference number: [id number].   10/23/2024 7:09:40 AM CDT

## 2024-12-16 ENCOUNTER — OFFICE VISIT (OUTPATIENT)
Dept: FAMILY MEDICINE | Facility: CLINIC | Age: 44
End: 2024-12-16
Payer: COMMERCIAL

## 2024-12-16 VITALS
DIASTOLIC BLOOD PRESSURE: 80 MMHG | BODY MASS INDEX: 20.35 KG/M2 | WEIGHT: 87.94 LBS | SYSTOLIC BLOOD PRESSURE: 105 MMHG | HEART RATE: 94 BPM | HEIGHT: 55 IN | OXYGEN SATURATION: 99 %

## 2024-12-16 DIAGNOSIS — J21.0 RSV (ACUTE BRONCHIOLITIS DUE TO RESPIRATORY SYNCYTIAL VIRUS): Primary | ICD-10-CM

## 2024-12-16 PROCEDURE — 1159F MED LIST DOCD IN RCRD: CPT | Mod: S$GLB,,, | Performed by: FAMILY MEDICINE

## 2024-12-16 PROCEDURE — 99213 OFFICE O/P EST LOW 20 MIN: CPT | Mod: S$GLB,,, | Performed by: FAMILY MEDICINE

## 2024-12-16 PROCEDURE — 3008F BODY MASS INDEX DOCD: CPT | Mod: S$GLB,,, | Performed by: FAMILY MEDICINE

## 2024-12-16 PROCEDURE — 3044F HG A1C LEVEL LT 7.0%: CPT | Mod: S$GLB,,, | Performed by: FAMILY MEDICINE

## 2024-12-16 PROCEDURE — 3074F SYST BP LT 130 MM HG: CPT | Mod: S$GLB,,, | Performed by: FAMILY MEDICINE

## 2024-12-16 PROCEDURE — 99999 PR PBB SHADOW E&M-EST. PATIENT-LVL III: CPT | Mod: PBBFAC,,, | Performed by: FAMILY MEDICINE

## 2024-12-16 PROCEDURE — 3079F DIAST BP 80-89 MM HG: CPT | Mod: S$GLB,,, | Performed by: FAMILY MEDICINE

## 2024-12-16 RX ORDER — PREDNISONE 10 MG/1
30 TABLET ORAL DAILY
Qty: 15 TABLET | Refills: 0 | Status: SHIPPED | OUTPATIENT
Start: 2024-12-16 | End: 2024-12-21

## 2024-12-16 NOTE — PROGRESS NOTES
Subjective     Patient ID: Gogo Muro is a 44 y.o. female.    Chief Complaint: Cough and Generalized Body Aches    Urgent care visit    RSV circulating through household.  Patient having 4 day history of cough, nasal congestion fatigue, sore throat wheezing.  Using DayQuil and NyQuil    Cough  This is a new problem. The current episode started in the past 7 days. The problem has been waxing and waning. The problem occurs constantly. The cough is Non-productive. Associated symptoms include chest pain, ear congestion, headaches, myalgias, nasal congestion, postnasal drip, rhinorrhea, a sore throat and wheezing. Pertinent negatives include no chills, ear pain, fever, heartburn, hemoptysis, rash, shortness of breath, sweats or weight loss. Nothing aggravates the symptoms. She has tried OTC cough suppressant and rest for the symptoms. The treatment provided mild relief. There is no history of asthma, bronchiectasis, bronchitis, COPD, emphysema, environmental allergies or pneumonia.     Review of Systems   Constitutional:  Negative for chills, fever and weight loss.   HENT:  Positive for postnasal drip, rhinorrhea and sore throat. Negative for ear pain.    Respiratory:  Positive for cough and wheezing. Negative for hemoptysis and shortness of breath.    Cardiovascular:  Positive for chest pain.   Gastrointestinal:  Negative for heartburn.   Musculoskeletal:  Positive for myalgias.   Integumentary:  Negative for rash.   Allergic/Immunologic: Negative for environmental allergies.   Neurological:  Positive for headaches.          Objective     Physical Exam  Vitals reviewed.   Constitutional:       General: She is not in acute distress.     Appearance: Normal appearance. She is normal weight. She is not ill-appearing or toxic-appearing.   HENT:      Right Ear: There is no impacted cerumen.      Left Ear: There is no impacted cerumen.      Nose: Congestion and rhinorrhea present.      Mouth/Throat:      Pharynx: No  oropharyngeal exudate or posterior oropharyngeal erythema.   Eyes:      General:         Right eye: No discharge.         Left eye: No discharge.      Conjunctiva/sclera: Conjunctivae normal.   Cardiovascular:      Rate and Rhythm: Regular rhythm.      Pulses: Normal pulses.      Heart sounds: Normal heart sounds.   Pulmonary:      Effort: Pulmonary effort is normal.      Breath sounds: Normal breath sounds. No wheezing.   Chest:      Chest wall: No tenderness.   Musculoskeletal:      Cervical back: Neck supple. No tenderness.   Lymphadenopathy:      Cervical: No cervical adenopathy.   Neurological:      General: No focal deficit present.      Mental Status: She is alert and oriented to person, place, and time.   Psychiatric:         Mood and Affect: Mood normal.         Behavior: Behavior normal.            Assessment and Plan     1. RSV (acute bronchiolitis due to respiratory syncytial virus)    Other orders  -     predniSONE (DELTASONE) 10 MG tablet; Take 3 tablets (30 mg total) by mouth once daily. for 5 days  Dispense: 15 tablet; Refill: 0      Continue supportive treatment   We will add steroids   Advised to contact clinic if no improvement or symptoms worsen by Friday  Risks, benefits, and side effects were discussed with the patient. All questions were answered to the fullest satisfaction of the patient, and pt verbalized understanding and agreement to treatment plan.          Lottie Worrell MD  Family Medicine Physician   [facility]     This note was created using [employer] voice recognition software that occasionally may misinterpret phrases or words.

## 2024-12-19 DIAGNOSIS — R11.0 NAUSEA: ICD-10-CM

## 2024-12-19 NOTE — TELEPHONE ENCOUNTER
Refill Routing Note   Medication(s) are not appropriate for processing by [facility] for the following reason(s):        Outside of protocol    [facility] action(s):  Route             Appointments  past 12m or future 3m with PCP    Date Provider   Last Visit   8/15/2024 Camila Atkins MD   Next Visit   Visit date not found Camila Atkins MD   ED visits in past 90 days: 0        Note composed:7:41 AM 12/19/2024

## 2024-12-19 NOTE — TELEPHONE ENCOUNTER
No care due was identified.  Health [facility] Embedded Care Due Messages. Reference number: [id number].   12/19/2024 7:16:26 AM CST

## 2024-12-20 RX ORDER — ONDANSETRON 4 MG/1
4 TABLET, ORALLY DISINTEGRATING ORAL EVERY 6 HOURS PRN
Qty: 90 TABLET | Refills: 0 | Status: SHIPPED | OUTPATIENT
Start: 2024-12-20

## 2025-01-24 DIAGNOSIS — G43.109 MIGRAINE WITH AURA AND WITHOUT STATUS MIGRAINOSUS, NOT INTRACTABLE: ICD-10-CM

## 2025-01-24 RX ORDER — BUTALBITAL, ACETAMINOPHEN AND CAFFEINE 50; 325; 40 MG/1; MG/1; MG/1
1 TABLET ORAL EVERY 6 HOURS PRN
Qty: 30 TABLET | Refills: 0 | Status: SHIPPED | OUTPATIENT
Start: 2025-01-24

## 2025-01-24 NOTE — TELEPHONE ENCOUNTER
No care due was identified.  Health [facility] Embedded Care Due Messages. Reference number: [id number].   1/24/2025 8:53:30 AM CST

## 2025-02-28 ENCOUNTER — HOSPITAL ENCOUNTER (OUTPATIENT)
Dept: RADIOLOGY | Facility: HOSPITAL | Age: 45
Discharge: HOME | End: 2025-02-28
Attending: FAMILY MEDICINE
Payer: COMMERCIAL

## 2025-02-28 ENCOUNTER — RESULTS FOLLOW-UP (OUTPATIENT)
Dept: FAMILY MEDICINE | Facility: CLINIC | Age: 45
End: 2025-02-28

## 2025-02-28 DIAGNOSIS — N63.20 MASS OF LEFT BREAST, UNSPECIFIED QUADRANT: ICD-10-CM

## 2025-02-28 PROCEDURE — 77061 BREAST TOMOSYNTHESIS UNI: CPT | Mod: 26,LT,, | Performed by: RADIOLOGY

## 2025-02-28 PROCEDURE — 77065 DX MAMMO INCL CAD UNI: CPT | Mod: 26,LT,, | Performed by: RADIOLOGY

## 2025-02-28 PROCEDURE — 77065 DX MAMMO INCL CAD UNI: CPT | Mod: TC,LT

## 2025-03-07 ENCOUNTER — OFFICE VISIT (OUTPATIENT)
Dept: FAMILY MEDICINE | Facility: CLINIC | Age: 45
End: 2025-03-07
Payer: COMMERCIAL

## 2025-03-07 ENCOUNTER — PATIENT MESSAGE (OUTPATIENT)
Dept: INTERNAL MEDICINE | Facility: CLINIC | Age: 45
End: 2025-03-07
Payer: COMMERCIAL

## 2025-03-07 DIAGNOSIS — G43.109 MIGRAINE WITH AURA AND WITHOUT STATUS MIGRAINOSUS, NOT INTRACTABLE: ICD-10-CM

## 2025-03-07 DIAGNOSIS — R11.0 NAUSEA: ICD-10-CM

## 2025-03-07 DIAGNOSIS — Z71.84 TRAVEL ADVICE ENCOUNTER: Primary | ICD-10-CM

## 2025-03-07 DIAGNOSIS — F41.8 SITUATIONAL ANXIETY: ICD-10-CM

## 2025-03-07 PROCEDURE — 1160F RVW MEDS BY RX/DR IN RCRD: CPT | Mod: 95,,, | Performed by: FAMILY MEDICINE

## 2025-03-07 PROCEDURE — G2211 COMPLEX E/M VISIT ADD ON: HCPCS | Mod: 95,,, | Performed by: FAMILY MEDICINE

## 2025-03-07 PROCEDURE — 1159F MED LIST DOCD IN RCRD: CPT | Mod: 95,,, | Performed by: FAMILY MEDICINE

## 2025-03-07 PROCEDURE — 98006 SYNCH AUDIO-VIDEO EST MOD 30: CPT | Mod: 95,,, | Performed by: FAMILY MEDICINE

## 2025-03-07 RX ORDER — AZITHROMYCIN 250 MG/1
TABLET, FILM COATED ORAL
Qty: 6 TABLET | Refills: 0 | Status: SHIPPED | OUTPATIENT
Start: 2025-03-07 | End: 2025-03-12

## 2025-03-07 RX ORDER — BUTALBITAL, ACETAMINOPHEN AND CAFFEINE 50; 325; 40 MG/1; MG/1; MG/1
1 TABLET ORAL EVERY 6 HOURS PRN
Qty: 30 TABLET | Refills: 0 | Status: SHIPPED | OUTPATIENT
Start: 2025-03-07

## 2025-03-07 RX ORDER — OSELTAMIVIR PHOSPHATE 75 MG/1
75 CAPSULE ORAL DAILY
Qty: 10 CAPSULE | Refills: 0 | Status: SHIPPED | OUTPATIENT
Start: 2025-03-07 | End: 2025-03-17

## 2025-03-07 RX ORDER — NITROFURANTOIN 25; 75 MG/1; MG/1
100 CAPSULE ORAL 2 TIMES DAILY
Qty: 10 CAPSULE | Refills: 0 | Status: SHIPPED | OUTPATIENT
Start: 2025-03-07 | End: 2025-03-12

## 2025-03-07 RX ORDER — METHYLPREDNISOLONE 4 MG/1
TABLET ORAL
Qty: 21 EACH | Refills: 0 | Status: SHIPPED | OUTPATIENT
Start: 2025-03-07 | End: 2025-03-28

## 2025-03-07 RX ORDER — RIMEGEPANT SULFATE 75 MG/75MG
75 TABLET, ORALLY DISINTEGRATING ORAL DAILY PRN
Qty: 16 TABLET | Refills: 2 | Status: SHIPPED | OUTPATIENT
Start: 2025-03-07

## 2025-03-07 RX ORDER — BENZONATATE 200 MG/1
200 CAPSULE ORAL 3 TIMES DAILY PRN
Qty: 30 CAPSULE | Refills: 0 | Status: SHIPPED | OUTPATIENT
Start: 2025-03-07 | End: 2025-03-17

## 2025-03-07 RX ORDER — ONDANSETRON 4 MG/1
4 TABLET, ORALLY DISINTEGRATING ORAL EVERY 6 HOURS PRN
Qty: 90 TABLET | Refills: 0 | Status: SHIPPED | OUTPATIENT
Start: 2025-03-07

## 2025-03-07 RX ORDER — ONDANSETRON 4 MG/1
4 TABLET, ORALLY DISINTEGRATING ORAL EVERY 6 HOURS PRN
Qty: 90 TABLET | Refills: 0 | Status: SHIPPED | OUTPATIENT
Start: 2025-03-07 | End: 2025-03-07
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
## 2025-03-26 ENCOUNTER — PATIENT MESSAGE (OUTPATIENT)
Dept: INTERNAL MEDICINE | Facility: CLINIC | Age: 45
End: 2025-03-26
Payer: COMMERCIAL

## 2025-04-21 DIAGNOSIS — G43.109 MIGRAINE WITH AURA AND WITHOUT STATUS MIGRAINOSUS, NOT INTRACTABLE: ICD-10-CM

## 2025-04-21 NOTE — TELEPHONE ENCOUNTER
Care Due:                  Date            Visit Type   Department     Provider  --------------------------------------------------------------------------------                                ESTABLISHED                              PATIENT -    [facility]  Last Visit: 03-      VIRTUAL      MEDICINE       Camila Atkins                              EP -                              PRIMARY      [facility]  Next Visit: 08-      CARE ([facility])   MEDICINE       Camila Atkins                                                            Last  Test          Frequency    Reason                     Performed    Due Date  --------------------------------------------------------------------------------    CBC.........  12 months..  valACYclovir.............  07- 07-    Cr..........  12 months..  valACYclovir.............  07- 07-    [facility] Embedded Care Due Messages. Reference number: [id number].   4/21/2025 3:08:56 PM CDT

## 2025-04-23 ENCOUNTER — PATIENT MESSAGE (OUTPATIENT)
Dept: FAMILY MEDICINE | Facility: CLINIC | Age: 45
End: 2025-04-23
Payer: COMMERCIAL

## 2025-04-23 RX ORDER — BUTALBITAL, ACETAMINOPHEN AND CAFFEINE 50; 325; 40 MG/1; MG/1; MG/1
1 TABLET ORAL EVERY 6 HOURS PRN
Qty: 20 TABLET | Refills: 5 | Status: SHIPPED | OUTPATIENT
Start: 2025-04-23

## 2025-04-25 ENCOUNTER — PATIENT MESSAGE (OUTPATIENT)
Dept: ADMINISTRATIVE | Facility: HOSPITAL | Age: 45
End: 2025-04-25
Payer: COMMERCIAL

## 2025-07-11 ENCOUNTER — TELEPHONE (OUTPATIENT)
Dept: FAMILY MEDICINE | Facility: CLINIC | Age: 45
End: 2025-07-11
Payer: COMMERCIAL

## 2025-07-11 NOTE — TELEPHONE ENCOUNTER
Copied from CRM #[id number]. Topic: Appointments - Appointment Access  >> Jul 11, 2025  9:38 AM Fauzia wrote:  Type:  Needs Medical Advice    Who Called: self  Symptoms (please be specific): needs to reschedule her appt, I tried but no availability until December      Would the patient rather a call back or a response via [facility]? call  Best Call Back Number:[phone]    Additional Information: please advise and thank you,

## 2025-08-27 ENCOUNTER — OFFICE VISIT (OUTPATIENT)
Dept: FAMILY MEDICINE | Facility: CLINIC | Age: 45
End: 2025-08-27
Payer: COMMERCIAL

## 2025-08-27 VITALS
DIASTOLIC BLOOD PRESSURE: 80 MMHG | WEIGHT: 89.38 LBS | OXYGEN SATURATION: 98 % | HEIGHT: 55 IN | SYSTOLIC BLOOD PRESSURE: 120 MMHG | BODY MASS INDEX: 20.68 KG/M2 | HEART RATE: 75 BPM

## 2025-08-27 DIAGNOSIS — R79.89 LOW VITAMIN D LEVEL: ICD-10-CM

## 2025-08-27 DIAGNOSIS — Z12.11 COLON CANCER SCREENING: ICD-10-CM

## 2025-08-27 DIAGNOSIS — G43.109 MIGRAINE WITH AURA AND WITHOUT STATUS MIGRAINOSUS, NOT INTRACTABLE: ICD-10-CM

## 2025-08-27 DIAGNOSIS — Z79.899 ENCOUNTER FOR LONG-TERM (CURRENT) USE OF MEDICATIONS: ICD-10-CM

## 2025-08-27 DIAGNOSIS — E53.8 LOW SERUM VITAMIN B12: Primary | ICD-10-CM

## 2025-08-27 PROCEDURE — 99999 PR PBB SHADOW E&M-EST. PATIENT-LVL III: CPT | Mod: PBBFAC,,, | Performed by: NURSE PRACTITIONER

## 2025-08-27 RX ORDER — BUTALBITAL, ACETAMINOPHEN AND CAFFEINE 50; 325; 40 MG/1; MG/1; MG/1
1 TABLET ORAL EVERY 6 HOURS PRN
Qty: 20 TABLET | Refills: 5 | Status: SHIPPED | OUTPATIENT
Start: 2025-08-27

## 2025-08-27 RX ORDER — RIMEGEPANT SULFATE 75 MG/75MG
75 TABLET, ORALLY DISINTEGRATING ORAL DAILY PRN
Qty: 16 TABLET | Refills: 2 | Status: SHIPPED | OUTPATIENT
Start: 2025-08-27